# Patient Record
Sex: MALE | Race: BLACK OR AFRICAN AMERICAN | Employment: OTHER | RURAL
[De-identification: names, ages, dates, MRNs, and addresses within clinical notes are randomized per-mention and may not be internally consistent; named-entity substitution may affect disease eponyms.]

---

## 2017-01-03 DIAGNOSIS — I10 ESSENTIAL HYPERTENSION, BENIGN: ICD-10-CM

## 2017-01-03 RX ORDER — LISINOPRIL AND HYDROCHLOROTHIAZIDE 20; 25 MG/1; MG/1
TABLET ORAL
Qty: 90 TAB | Refills: 0 | Status: SHIPPED | OUTPATIENT
Start: 2017-01-03 | End: 2017-04-05 | Stop reason: SDUPTHER

## 2017-04-05 DIAGNOSIS — I10 ESSENTIAL HYPERTENSION, BENIGN: ICD-10-CM

## 2017-04-05 RX ORDER — LISINOPRIL AND HYDROCHLOROTHIAZIDE 20; 25 MG/1; MG/1
TABLET ORAL
Qty: 90 TAB | Refills: 0 | Status: SHIPPED | OUTPATIENT
Start: 2017-04-05 | End: 2017-07-07 | Stop reason: SDUPTHER

## 2017-04-05 NOTE — TELEPHONE ENCOUNTER
Patient would like a refill on lisinopril. He is scheduled to see Shilpi Nick on May 5, 2017. He has ONE tab left! [place].

## 2017-04-20 RX ORDER — VERAPAMIL HYDROCHLORIDE 240 MG/1
CAPSULE, EXTENDED RELEASE ORAL
Qty: 180 CAP | Refills: 3 | Status: SHIPPED | OUTPATIENT
Start: 2017-04-20 | End: 2018-04-17 | Stop reason: SDUPTHER

## 2017-04-28 RX ORDER — FENOFIBRATE 134 MG/1
CAPSULE ORAL
Qty: 90 CAP | Refills: 0 | Status: SHIPPED | OUTPATIENT
Start: 2017-04-28 | End: 2017-08-02 | Stop reason: SDUPTHER

## 2017-05-05 ENCOUNTER — OFFICE VISIT (OUTPATIENT)
Dept: FAMILY MEDICINE CLINIC | Age: 62
End: 2017-05-05

## 2017-05-05 VITALS
TEMPERATURE: 98 F | WEIGHT: 177.2 LBS | SYSTOLIC BLOOD PRESSURE: 160 MMHG | HEART RATE: 80 BPM | OXYGEN SATURATION: 99 % | HEIGHT: 64 IN | BODY MASS INDEX: 30.25 KG/M2 | DIASTOLIC BLOOD PRESSURE: 80 MMHG | RESPIRATION RATE: 20 BRPM

## 2017-05-05 DIAGNOSIS — N18.30 CKD (CHRONIC KIDNEY DISEASE) STAGE 3, GFR 30-59 ML/MIN (HCC): ICD-10-CM

## 2017-05-05 DIAGNOSIS — Z00.00 HEALTH CARE MAINTENANCE: ICD-10-CM

## 2017-05-05 DIAGNOSIS — E78.00 PURE HYPERCHOLESTEROLEMIA: ICD-10-CM

## 2017-05-05 DIAGNOSIS — I10 ESSENTIAL HYPERTENSION, BENIGN: Primary | ICD-10-CM

## 2017-05-05 DIAGNOSIS — Z23 ENCOUNTER FOR IMMUNIZATION: ICD-10-CM

## 2017-05-05 NOTE — PATIENT INSTRUCTIONS
Tetanus and Diphtheria Booster: Care Instructions  Your Care Instructions  A diphtheria and tetanus (Td) vaccine protects people against diphtheria and tetanus (lockjaw). You need a Td shot every 10 years to help prevent these diseases, which can be fatal.  You may also need a Td booster if you get a puncture wound or dirty cut. A booster is another dose of the vaccine. Young teens get a booster at 6to 15years of age. This booster is called Tdap and includes the vaccine against pertussis (whooping cough). All teens and adults who never had Tdap also need one dose of this vaccine. Common side effects of Td vaccination include soreness in the arm where you got the shot and a mild fever. These usually occur within 3 days of the shot and last a short time. Follow-up care is a key part of your treatment and safety. Be sure to make and go to all appointments, and call your doctor if you are having problems. It's also a good idea to know your test results and keep a list of the medicines you take. How can you care for yourself at home? · Take an over-the-counter pain medicine, such as acetaminophen (Tylenol), ibuprofen (Advil, Motrin), or naproxen (Aleve), if your arm is sore after the shot. Be safe with medicines. Read and follow all instructions on the label. Do not give aspirin to anyone younger than 20. It has been linked to Reye syndrome, a serious illness. · Put ice or a cold pack on the sore area for 10 to 20 minutes at a time. Put a thin cloth between the ice and your skin. When should you call for help? Call 911 anytime you think you may need emergency care. For example, call if:  · You have symptoms of a severe allergic reaction. These may include:  ¨ Sudden raised, red areas (hives) all over your body. ¨ Swelling of the throat, mouth, lips, or tongue. ¨ Trouble breathing. ¨ Passing out (losing consciousness). Or you may feel very lightheaded or suddenly feel weak, confused, or restless.   · You have a seizure. Call your doctor now or seek immediate medical care if:  · You have symptoms of an allergic reaction, such as:  ¨ A rash or hives (raised, red areas on the skin). ¨ Itching. ¨ Swelling. ¨ Belly pain, nausea, or vomiting. · You have a high fever. Watch closely for changes in your health, and be sure to contact your doctor if you have any problems. Where can you learn more? Go to http://jose-chester.info/. Enter V332 in the search box to learn more about \"Tetanus and Diphtheria Booster: Care Instructions. \"  Current as of: February 9, 2016  Content Version: 11.2  © 8605-3820 [employer]. Care instructions adapted under license by [employer] (which disclaims liability or warranty for this information). If you have questions about a medical condition or this instruction, always ask your healthcare professional. [place] [address] any warranty or liability for your use of this information. Shingles Vaccine: What You Need to Know  What is shingles? Shingles is a painful skin rash, often with blisters. It is also called herpes zoster, or just zoster. A shingles rash usually appears on one side of the face or body and lasts from 2 to 4 weeks. Its main symptom is pain, which can be quite severe. Other symptoms of shingles can include fever, headache, chills and upset stomach. Very rarely, a shingles infection can lead to pneumonia, hearing problems, blindness, brain inflammation (encephalitis) or death. For about 1 person in 5, severe pain can continue even long after the rash clears up. This is called post-herpetic neuralgia. Shingles is caused by the varicella zoster virus, the same virus that causes chickenpox. Only someone who has had chickenpox--or, rarely, has gotten chickenpox vaccine--can get shingles. The virus stays in your body, and can cause shingles many years later.   You can't catch shingles from another person with shingles. However, a person who has never had chickenpox (or chickenpox vaccine) could get chickenpox from someone with shingles. This is not very common. Shingles is far more common in people 48years of age and older than in younger people. It is also more common in people whose immune systems are weakened because of a disease such as cancer, or drugs such as steroids or chemotherapy. At least 1 million people a year in the [facility] get shingles. Shingles vaccine  A vaccine for shingles was licensed in 4238. In clinical trials, the vaccine reduced the risk of shingles by 50%. It can also reduce pain in people who still get shingles after being vaccinated. A single dose of shingles vaccine is recommended for adults 61years of age and older. Some people should not get shingles vaccine or should wait  A person should not get shingles vaccine who:  · Has ever had a life-threatening allergic reaction to gelatin, the antibiotic neomycin, or any other component of shingles vaccine. Tell your doctor if you have any severe allergies. · Has a weakened immune system because of current:  ¨ AIDS or another disease that affects the immune system. ¨ Treatment with drugs that affect the immune system, such as prolonged use of high-dose steroids. ¨ Cancer treatment such as radiation or chemotherapy. ¨ Cancer affecting the bone marrow or lymphatic system, such as leukemia or lymphoma. · Is pregnant, or might be pregnant. Women should not become pregnant until at least 4 weeks after getting shingles vaccine. Someone with a minor acute illness, such as a cold, may be vaccinated. But anyone with a moderate or severe acute illness should usually wait until they recover before getting the vaccine. This includes anyone with a temperature of 101.3° F or higher. What are the risks from shingles vaccine? A vaccine, like any medicine, could possibly cause serious problems, such as severe allergic reactions.  However, the risk of a vaccine causing serious harm, or death, is extremely small. No serious problems have been identified with shingles vaccine. Mild problems  · Redness, soreness, swelling, or itching at the site of the injection (about 1 person in 3)  · Headache (about 1 person in 70)  Like all vaccines, shingles vaccine is being closely monitored for unusual or severe problems. What if there is a serious reaction? What should I look for? · Look for anything that concerns you, such as signs of a severe allergic reaction, very high fever, or behavior changes. Signs of a severe allergic reaction can include hives, swelling of the face and throat, difficulty breathing, a fast heartbeat, dizziness, and weakness. These would start a few minutes to a few hours after the vaccination. What should I do? · If you think it is a severe allergic reaction or other emergency that can't wait, call 9-1-1 or get the person to the nearest hospital. Otherwise, call your doctor. · Afterward, the reaction should be reported to the Vaccine Adverse Event Reporting System (VAERS). Your doctor might file this report, or you can do it yourself through the VAERS web site at www.vaers. hhs.gov, or by calling 8-434.179.7028. VAERS is only for reporting reactions. They do not give medical advice. How can I learn more? · Ask your doctor. · Call your local or state health department. · Contact the Centers for Disease Control and Prevention (CDC):  ¨ Call 5-675.735.4786 (1-800-CDC-INFO) or  ¨ Visit CDC's website at www.cdc.gov/vaccines  Vaccine Information Statement  Shingles Vaccine  (10/6/2009)  Department of Health and Human Services  Centers for Disease Control and Prevention  Many Vaccine Information Statements are available in Malay and other languages. See www.immunize.org/vis. Muchas hojas de información sobre vacunas están disponibles en español y en otros idiomas. Visite www.immunize.org/vis.   Care instructions adapted under license by your healthcare professional. If you have questions about a medical condition or this instruction, always ask your healthcare professional. [place] [address] any warranty or liability for your use of this information.

## 2017-05-05 NOTE — PROGRESS NOTES
Subjective:     Shweta Guerra is a 62 y.o. male who presents today with the following:  Chief Complaint   Patient presents with    Cholesterol Problem     checkup    Hypertension   Shweta Guerra works as a  at [employer]. HTN: States he has white coat syndrome. Discussed checking BP at home and bringing in the readings. He is receptive. Doing well no interval problems. No chest pain, no angina no shortness of breath. No orthopnea or PND. No dependent edema. No abdominal pain, change in bowel habits, no blood in stool or black stools. CKD: No urinary frequency, urgency, dysuria. No change in voiding pattern or stream, no significant nocturia. Continues to follow-up with nephrology about every 6 months     No tobacco use, light social alcohol use   No problems with medications and is compliant. HM: Overdue on colonoscopy declines receptive to do a FIT test.  Fasting labs today. Sees an eye doctor and dentist on a regular basis.        ROS:  Gen: denies fever, chills, fatigue, weight loss, weight gain  HEENT:denies blurry vision, nasal congestion, sore throat  Resp: denies dypsnea, cough, wheezing  CV: denies chest pain radiating to the jaws or arms, palpitations, lower extremity edema  Abd: denies nausea, vomiting, diarrhea, constipation  Neuro: denies numbness/tingling  Endo: denies polyuria, polydipsia, heat/cold intolerance  Heme: no lymphadenopathy    No Known Allergies      Current Outpatient Prescriptions:     varicella zoster vacine live (VARICELLA-ZOSTER VACINE LIVE) 19,400 unit/0.65 mL susr injection, 1 Vial by SubCUTAneous route once for 1 dose., Disp: 0.65 mL, Rfl: 0    fenofibrate micronized (LOFIBRA) 134 mg capsule, TAKE ONE CAPSULE BY MOUTH EVERY DAY, Disp: 90 Cap, Rfl: 0    verapamil ER (VERELAN) 240 mg ER capsule, 2 Daily. (240 mg ER ), Disp: 180 Cap, Rfl: 3    lisinopril-hydroCHLOROthiazide (PRINZIDE, ZESTORETIC) 20-25 mg per tablet, Take 1 Tab by mouth daily., Disp: 90 Tab, Rfl: 0    KRILL OIL PO, Take 1 Tab by mouth daily. Alternates with fish oil., Disp: , Rfl:     omega-3 fatty acids-vitamin e (FISH OIL) 1,000 mg cap, Take 2 Caps by mouth daily. , Disp: , Rfl:     Past Medical History:   Diagnosis Date    CKD (chronic kidney disease) stage 3, GFR 30-59 ml/min     Hypertension     Hypopotassemia 2009    Other and unspecified hyperlipidemia 2008       No past surgical history on file. History   Smoking Status    Never Smoker   Smokeless Tobacco    Not on file       Social History     Social History    Marital status: UNKNOWN     Spouse name: N/A    Number of children: N/A    Years of education: N/A     Social History Main Topics    Smoking status: Never Smoker    Smokeless tobacco: None    Alcohol use No    Drug use: None    Sexual activity: Not Asked     Other Topics Concern    None     Social History Narrative       Family History   Problem Relation Age of Onset    Hypertension Mother          Objective:     Visit Vitals    /80 (BP 1 Location: Left arm, BP Patient Position: Sitting)    Pulse 80    Temp 98 °F (36.7 °C) (Temporal)    Resp 20    Ht 5' 4\" (1.626 m)    Wt 177 lb 3.2 oz (80.4 kg)    SpO2 99%    BMI 30.42 kg/m2     Body mass index is 30.42 kg/(m^2). General: Alert and oriented. No acute distress. Well nourished  HEENT :  Ears:TMs are normal. Canals are clear. Eyes: pupils equal, round, react to light and accommodation. Extra ocular movements intact. Nose: patent. Mouth and throat is clear. Neck:supple full range of motion no thyromegaly. Trachea midline, No carotid bruits. No significant lymphadenopathy  Lungs[de-identified] clear to auscultation without wheezes, rales, or rhonchi. Heart :RRR, S1 & S2 are normal intensity. No murmur; no gallop  Abdomen: bowel sounds active. No tenderness, guarding, rebound, masses, hepatic or spleen enlargement  Back: no CVA tenderness.   Extremities: without clubbing, cyanosis, or edema  Pulses: radial and femoral pulses are normal  Neuro: HMF intact. Cranial nerves II through XII grossly normal.  Motor: is 5 over 5 and symmetrical.   Deep tendon reflexes: +2 equal        No results found for this visit on 17. Assessment/ Plan:     Alane Opitz was seen today for cholesterol problem and hypertension. Diagnoses and all orders for this visit:    Essential hypertension, benign  -     LIPID PANEL  -     CBC WITH AUTOMATED DIFF  -     METABOLIC PANEL, COMPREHENSIVE    Pure hypercholesterolemia  -     LIPID PANEL  -     CBC WITH AUTOMATED DIFF  -     METABOLIC PANEL, COMPREHENSIVE    CKD (chronic kidney disease) stage 3, GFR 30-59 ml/min  -     CBC WITH AUTOMATED DIFF  -     METABOLIC PANEL, COMPREHENSIVE    Health care maintenance  -     HEPATITIS C AB  -     PSA - PROSTATE SPECIFIC AG    Encounter for immunization  -     TETANUS, DIPHTHERIA TOXOIDS AND ACELLULAR PERTUSSIS VACCINE (TDAP), IN INDIVIDS. >=7, IM    Other orders  -     varicella zoster vacine live (VARICELLA-ZOSTER VACINE LIVE) 19,400 unit/0.65 mL susr injection; 1 Vial by SubCUTAneous route once for 1 dose. 1. Essential hypertension, benign    2. Pure hypercholesterolemia    3. CKD (chronic kidney disease) stage 3, GFR 30-59 ml/min    4. Health care maintenance    5. Encounter for immunization        Orders Placed This Encounter    TETANUS, DIPHTHERIA TOXOIDS AND ACELLULAR PERTUSSIS VACCINE (TDAP), IN INDIVIDS. >=7, IM    LIPID PANEL    CBC WITH AUTOMATED DIFF    METABOLIC PANEL, COMPREHENSIVE    HEPATITIS C AB    PSA - PROSTATE SPECIFIC AG    varicella zoster vacine live (VARICELLA-ZOSTER VACINE LIVE) 19,400 unit/0.65 mL susr injection     Si Vial by SubCUTAneous route once for 1 dose. Dispense:  0.65 mL     Refill:  0     [facility]     RTO in 6 months or sooner as needed. Declined Tetanus vaccine at the end of the visit.     Verbal and written instructions (see AVS) provided.  Patient expresses understanding of diagnosis and treatment plan.     Tony Eden, NAM-C

## 2017-05-05 NOTE — MR AVS SNAPSHOT
Visit Information Date & Time Provider Department Dept. Phone Encounter #  
 5/5/2017  7:30 AM Bryant Murphy NP [facility] [address] [facility] [phone] [id number] Follow-up Instructions Return in about 6 months (around 11/5/2017). Upcoming Health Maintenance Date Due Hepatitis C Screening 1955 DTaP/Tdap/Td series (1 - Tdap) 6/25/1977 ZOSTER VACCINE AGE 60> 6/25/2016 FOBT Q 1 YEAR AGE 50-75 3/18/2017 INFLUENZA AGE 9 TO ADULT 8/1/2017 Allergies as of 5/5/2017  Review Complete On: 5/5/2017 By: Bryant Murphy NP No Known Allergies Current Immunizations  Never Reviewed Name Date Tdap  Incomplete Not reviewed this visit You Were Diagnosed With   
  
 Codes Comments Essential hypertension, benign    -  Primary ICD-10-CM: I10 
ICD-9-CM: 401.1 Pure hypercholesterolemia     ICD-10-CM: E78.00 ICD-9-CM: 272.0 CKD (chronic kidney disease) stage 3, GFR 30-59 ml/min     ICD-10-CM: N18.3 ICD-9-CM: 585.3 Health care maintenance     ICD-10-CM: Z00.00 ICD-9-CM: V70.0 Encounter for immunization     ICD-10-CM: Z37 ICD-9-CM: V03.89 Vitals BP Pulse Temp Resp Height(growth percentile) Weight(growth percentile) 160/80 (BP 1 Location: Left arm, BP Patient Position: Sitting) 80 98 °F (36.7 °C) (Temporal) 20 5' 4\" (1.626 m) 177 lb 3.2 oz (80.4 kg) SpO2 BMI Smoking Status 99% 30.42 kg/m2 Never Smoker BMI and BSA Data Body Mass Index Body Surface Area  
 30.42 kg/m 2 1.91 m 2 Preferred Pharmacy Pharmacy Name Phone [address], [address] [phone] Your Updated Medication List  
  
   
This list is accurate as of: 5/5/17  8:14 AM.  Always use your most recent med list.  
  
  
  
  
 fenofibrate micronized 134 mg capsule Commonly known as:  LOFIBRA TAKE ONE CAPSULE BY MOUTH EVERY DAY  
  
 FISH OIL 1,000 mg Cap Generic drug:  omega-3 fatty acids-vitamin e Take 2 Caps by mouth daily. KRILL OIL PO Take 1 Tab by mouth daily. Alternates with fish oil.  
  
 lisinopril-hydroCHLOROthiazide 20-25 mg per tablet Commonly known as:  Janora Rasp Take 1 Tab by mouth daily. varicella zoster vacine live 19,400 unit/0.65 mL Susr injection Commonly known as:  varicella-zoster vacine live 1 Vial by SubCUTAneous route once for 1 dose. verapamil  mg ER capsule Commonly known as:  VERELAN  
2 Daily. (240 mg ER ) Prescriptions Printed Refills  
 varicella zoster vacine live (VARICELLA-ZOSTER VACINE LIVE) 19,400 unit/0.65 mL susr injection 0 Si Vial by SubCUTAneous route once for 1 dose. Class: Print Route: SubCUTAneous We Performed the Following CBC WITH AUTOMATED DIFF [69600 CPT(R)] HEPATITIS C AB [64067 CPT(R)] LIPID PANEL [60228 CPT(R)] METABOLIC PANEL, COMPREHENSIVE [43122 CPT(R)] PROSTATE SPECIFIC AG (PSA) [id number] CPT(R)] TETANUS, DIPHTHERIA TOXOIDS AND ACELLULAR PERTUSSIS VACCINE (TDAP), IN INDIVIDS. >=7, IM [id number] CPT(R)] Follow-up Instructions Return in about 6 months (around 2017). Patient Instructions Tetanus and Diphtheria Booster: Care Instructions Your Care Instructions A diphtheria and tetanus (Td) vaccine protects people against diphtheria and tetanus (lockjaw). You need a Td shot every 10 years to help prevent these diseases, which can be fatal. 
You may also need a Td booster if you get a puncture wound or dirty cut. A booster is another dose of the vaccine. Young teens get a booster at 6to 15years of age. This booster is called Tdap and includes the vaccine against pertussis (whooping cough). All teens and adults who never had Tdap also need one dose of this vaccine.  
Common side effects of Td vaccination include soreness in the arm where you got the shot and a mild fever. These usually occur within 3 days of the shot and last a short time. Follow-up care is a key part of your treatment and safety. Be sure to make and go to all appointments, and call your doctor if you are having problems. It's also a good idea to know your test results and keep a list of the medicines you take. How can you care for yourself at home? · Take an over-the-counter pain medicine, such as acetaminophen (Tylenol), ibuprofen (Advil, Motrin), or naproxen (Aleve), if your arm is sore after the shot. Be safe with medicines. Read and follow all instructions on the label. Do not give aspirin to anyone younger than 20. It has been linked to Reye syndrome, a serious illness. · Put ice or a cold pack on the sore area for 10 to 20 minutes at a time. Put a thin cloth between the ice and your skin. When should you call for help? Call 911 anytime you think you may need emergency care. For example, call if: 
· You have symptoms of a severe allergic reaction. These may include: 
¨ Sudden raised, red areas (hives) all over your body. ¨ Swelling of the throat, mouth, lips, or tongue. ¨ Trouble breathing. ¨ Passing out (losing consciousness). Or you may feel very lightheaded or suddenly feel weak, confused, or restless. · You have a seizure. Call your doctor now or seek immediate medical care if: 
· You have symptoms of an allergic reaction, such as: ¨ A rash or hives (raised, red areas on the skin). ¨ Itching. ¨ Swelling. ¨ Belly pain, nausea, or vomiting. · You have a high fever. Watch closely for changes in your health, and be sure to contact your doctor if you have any problems. Where can you learn more? Go to http://jose-chester.info/. Enter V332 in the search box to learn more about \"Tetanus and Diphtheria Booster: Care Instructions. \" Current as of: February 9, 2016 Content Version: 11.2 © 7842-5279 Healthwise, [employer]. Care instructions adapted under license by [employer] (which disclaims liability or warranty for this information). If you have questions about a medical condition or this instruction, always ask your healthcare professional. [address] any warranty or liability for your use of this information. Shingles Vaccine: What You Need to Know What is shingles? Shingles is a painful skin rash, often with blisters. It is also called herpes zoster, or just zoster. A shingles rash usually appears on one side of the face or body and lasts from 2 to 4 weeks. Its main symptom is pain, which can be quite severe. Other symptoms of shingles can include fever, headache, chills and upset stomach. Very rarely, a shingles infection can lead to pneumonia, hearing problems, blindness, brain inflammation (encephalitis) or death. For about 1 person in 5, severe pain can continue even long after the rash clears up. This is called post-herpetic neuralgia. Shingles is caused by the varicella zoster virus, the same virus that causes chickenpox. Only someone who has had chickenpoxor, rarely, has gotten chickenpox vaccinecan get shingles. The virus stays in your body, and can cause shingles many years later. You can't catch shingles from another person with shingles. However, a person who has never had chickenpox (or chickenpox vaccine) could get chickenpox from someone with shingles. This is not very common. Shingles is far more common in people 48years of age and older than in younger people. It is also more common in people whose immune systems are weakened because of a disease such as cancer, or drugs such as steroids or chemotherapy. At least 1 million people a year in the [facility] get shingles. Shingles vaccine A vaccine for shingles was licensed in 3817.  In clinical trials, the vaccine reduced the risk of shingles by 50%. It can also reduce pain in people who still get shingles after being vaccinated. A single dose of shingles vaccine is recommended for adults 61years of age and older. Some people should not get shingles vaccine or should wait A person should not get shingles vaccine who: 
· Has ever had a life-threatening allergic reaction to gelatin, the antibiotic neomycin, or any other component of shingles vaccine. Tell your doctor if you have any severe allergies. · Has a weakened immune system because of current: 
¨ AIDS or another disease that affects the immune system. ¨ Treatment with drugs that affect the immune system, such as prolonged use of high-dose steroids. ¨ Cancer treatment such as radiation or chemotherapy. ¨ Cancer affecting the bone marrow or lymphatic system, such as leukemia or lymphoma. · Is pregnant, or might be pregnant. Women should not become pregnant until at least 4 weeks after getting shingles vaccine. Someone with a minor acute illness, such as a cold, may be vaccinated. But anyone with a moderate or severe acute illness should usually wait until they recover before getting the vaccine. This includes anyone with a temperature of 101.3° F or higher. What are the risks from shingles vaccine? A vaccine, like any medicine, could possibly cause serious problems, such as severe allergic reactions. However, the risk of a vaccine causing serious harm, or death, is extremely small. No serious problems have been identified with shingles vaccine. Mild problems · Redness, soreness, swelling, or itching at the site of the injection (about 1 person in 3) · Headache (about 1 person in 79) Like all vaccines, shingles vaccine is being closely monitored for unusual or severe problems. What if there is a serious reaction? What should I look for?  
· Look for anything that concerns you, such as signs of a severe allergic reaction, very high fever, or behavior changes. Signs of a severe allergic reaction can include hives, swelling of the face and throat, difficulty breathing, a fast heartbeat, dizziness, and weakness. These would start a few minutes to a few hours after the vaccination. What should I do? · If you think it is a severe allergic reaction or other emergency that can't wait, call 9-1-1 or get the person to the nearest hospital. Otherwise, call your doctor. · Afterward, the reaction should be reported to the Vaccine Adverse Event Reporting System (VAERS). Your doctor might file this report, or you can do it yourself through the VAERS web site at www.vaers. [facility].gov, or by calling 5-137.608.8065. VAERS is only for reporting reactions. They do not give medical advice. How can I learn more? · Ask your doctor. · Call your local or state health department. · Contact the Centers for Disease Control and Prevention (CDC): 
¨ Call 6-129.337.5596 (1-800-CDC-INFO) or ¨ Visit CDC's website at www.cdc.gov/vaccines Vaccine Information Statement Shingles Vaccine 
(10/6/2009) Department of Health and [employer] Centers for Disease Control and Prevention Many Vaccine Information Statements are available in Georgian and other languages. See www.immunize.org/vis. Muchas hojas de información sobre vacunas están disponibles en español y en otros idiomas. Visite www.immunize.org/vis. Care instructions adapted under license by your healthcare professional. If you have questions about a medical condition or this instruction, always ask your healthcare professional. [address] any warranty or liability for your use of this information. Introducing [facility]! [facility] introduces [employer] patient portal. Now you can access parts of your medical record, email your doctor's office, and request medication refills online.    
 
1. In your internet browser, go to [email or web address] 2. Click on the First Time User? Click Here link in the Sign In box. You will see the New Member Sign Up page. 3. Enter your LinkCycle Access Code exactly as it appears below. You will not need to use this code after youve completed the sign-up process. If you do not sign up before the expiration date, you must request a new code. · LinkCycle Access Code: [email or web address] Expires: 8/3/2017  8:14 AM 
 
4. Enter the last four digits of your Social Security Number (xxxx) and Date of Birth (mm/dd/yyyy) as indicated and click Submit. You will be taken to the next sign-up page. 5. Create a LinkCycle ID. This will be your LinkCycle login ID and cannot be changed, so think of one that is secure and easy to remember. 6. Create a LinkCycle password. You can change your password at any time. 7. Enter your Password Reset Question and Answer. This can be used at a later time if you forget your password. 8. Enter your e-mail address. You will receive e-mail notification when new information is available in [address] 9. Click Sign Up. You can now view and download portions of your medical record. 10. Click the Download Summary menu link to download a portable copy of your medical information. If you have questions, please visit the Frequently Asked Questions section of the LinkCycle website. Remember, LinkCycle is NOT to be used for urgent needs. For medical emergencies, dial 911. Now available from your iPhone and Android! Please provide this summary of care documentation to your next provider. Your primary care clinician is listed as Sofie Snellen. If you have any questions after today's visit, please call [phone].

## 2017-05-05 NOTE — ACP (ADVANCE CARE PLANNING)
Advance care planning discussed with patient given form and instructed to fill out and bring in to file in chart.

## 2017-05-06 LAB
ALBUMIN SERPL-MCNC: 4.9 G/DL (ref 3.6–4.8)
ALBUMIN/GLOB SERPL: 2.3 {RATIO} (ref 1.2–2.2)
ALP SERPL-CCNC: 29 IU/L (ref 39–117)
ALT SERPL-CCNC: 16 IU/L (ref 0–44)
AST SERPL-CCNC: 19 IU/L (ref 0–40)
BASOPHILS # BLD AUTO: 0 X10E3/UL (ref 0–0.2)
BASOPHILS NFR BLD AUTO: 1 %
BILIRUB SERPL-MCNC: 0.4 MG/DL (ref 0–1.2)
BUN SERPL-MCNC: 18 MG/DL (ref 8–27)
BUN/CREAT SERPL: 14 (ref 10–24)
CALCIUM SERPL-MCNC: 9.9 MG/DL (ref 8.6–10.2)
CHLORIDE SERPL-SCNC: 98 MMOL/L (ref 96–106)
CHOLEST SERPL-MCNC: 152 MG/DL (ref 100–199)
CO2 SERPL-SCNC: 24 MMOL/L (ref 18–29)
CREAT SERPL-MCNC: 1.26 MG/DL (ref 0.76–1.27)
EOSINOPHIL # BLD AUTO: 0.1 X10E3/UL (ref 0–0.4)
EOSINOPHIL NFR BLD AUTO: 3 %
ERYTHROCYTE [DISTWIDTH] IN BLOOD BY AUTOMATED COUNT: 14.2 % (ref 12.3–15.4)
GLOBULIN SER CALC-MCNC: 2.1 G/DL (ref 1.5–4.5)
GLUCOSE SERPL-MCNC: 108 MG/DL (ref 65–99)
HCT VFR BLD AUTO: 35.3 % (ref 37.5–51)
HCV AB S/CO SERPL IA: <0.1 S/CO RATIO (ref 0–0.9)
HDLC SERPL-MCNC: 55 MG/DL
HGB BLD-MCNC: 11.8 G/DL (ref 12.6–17.7)
IMM GRANULOCYTES # BLD: 0 X10E3/UL (ref 0–0.1)
IMM GRANULOCYTES NFR BLD: 0 %
INTERPRETATION: NORMAL
LDLC SERPL CALC-MCNC: 84 MG/DL (ref 0–99)
LYMPHOCYTES # BLD AUTO: 1.9 X10E3/UL (ref 0.7–3.1)
LYMPHOCYTES NFR BLD AUTO: 37 %
MCH RBC QN AUTO: 30.3 PG (ref 26.6–33)
MCHC RBC AUTO-ENTMCNC: 33.4 G/DL (ref 31.5–35.7)
MCV RBC AUTO: 91 FL (ref 79–97)
MONOCYTES # BLD AUTO: 0.4 X10E3/UL (ref 0.1–0.9)
MONOCYTES NFR BLD AUTO: 8 %
NEUTROPHILS # BLD AUTO: 2.6 X10E3/UL (ref 1.4–7)
NEUTROPHILS NFR BLD AUTO: 51 %
PLATELET # BLD AUTO: 331 X10E3/UL (ref 150–379)
POTASSIUM SERPL-SCNC: 3.8 MMOL/L (ref 3.5–5.2)
PROT SERPL-MCNC: 7 G/DL (ref 6–8.5)
PSA SERPL-MCNC: 0.8 NG/ML (ref 0–4)
RBC # BLD AUTO: 3.9 X10E6/UL (ref 4.14–5.8)
SODIUM SERPL-SCNC: 141 MMOL/L (ref 134–144)
TRIGL SERPL-MCNC: 64 MG/DL (ref 0–149)
VLDLC SERPL CALC-MCNC: 13 MG/DL (ref 5–40)
WBC # BLD AUTO: 5 X10E3/UL (ref 3.4–10.8)

## 2017-05-13 LAB — HEMOCCULT STL QL IA: NEGATIVE

## 2017-05-18 NOTE — PROGRESS NOTES
Negative occult blood. Hep C negative  Lipid panel within normal limits  Prostate normal limits  Metabolic panel glucose slightly elevated healthy diet recheck next visit  H&H slight decrease. Healthy diet and vitamins. Recheck level next visit.

## 2017-07-07 DIAGNOSIS — I10 ESSENTIAL HYPERTENSION, BENIGN: ICD-10-CM

## 2017-07-07 RX ORDER — LISINOPRIL AND HYDROCHLOROTHIAZIDE 20; 25 MG/1; MG/1
TABLET ORAL
Qty: 90 TAB | Refills: 3 | Status: SHIPPED | OUTPATIENT
Start: 2017-07-07 | End: 2018-07-16 | Stop reason: SDUPTHER

## 2017-08-03 RX ORDER — FENOFIBRATE 134 MG/1
CAPSULE ORAL
Qty: 90 CAP | Refills: 3 | Status: SHIPPED | OUTPATIENT
Start: 2017-08-03 | End: 2018-07-16 | Stop reason: SDUPTHER

## 2017-12-13 ENCOUNTER — OFFICE VISIT (OUTPATIENT)
Dept: FAMILY MEDICINE CLINIC | Age: 62
End: 2017-12-13

## 2017-12-13 VITALS
WEIGHT: 175 LBS | HEART RATE: 76 BPM | DIASTOLIC BLOOD PRESSURE: 62 MMHG | BODY MASS INDEX: 29.88 KG/M2 | HEIGHT: 64 IN | OXYGEN SATURATION: 99 % | TEMPERATURE: 97.5 F | RESPIRATION RATE: 20 BRPM | SYSTOLIC BLOOD PRESSURE: 122 MMHG

## 2017-12-13 DIAGNOSIS — N18.30 CKD (CHRONIC KIDNEY DISEASE) STAGE 3, GFR 30-59 ML/MIN (HCC): ICD-10-CM

## 2017-12-13 DIAGNOSIS — E78.00 PURE HYPERCHOLESTEROLEMIA: ICD-10-CM

## 2017-12-13 DIAGNOSIS — I10 ESSENTIAL HYPERTENSION, BENIGN: Primary | ICD-10-CM

## 2017-12-13 NOTE — PROGRESS NOTES
Subjective:     Laya Spears is a 65 y.o. male who presents today with the following:  Chief Complaint   Patient presents with    Hypertension     checkup    Cholesterol Problem   HPI:  Laya Spears works as a  for the local middle [employer]. Presents today for evaluation and management for chronic disease listed below:     COMPLIANT WITH MEDICATION:   HTN; Denies chest pain, dyspnea, palpitations, headache and blurred vision. Blood pressure normotensive. CKD: followed by Dr. Jose Claire will send labs      Hyperlipidemia: Check lipid panel today    BMI:Discussed the patient's BMI with him. The BMI follow up plan is as follows:     dietary management education, guidance, and counseling  encourage exercise  monitor weight  prescribed dietary intake    An After Visit Summary was printed and given to the patient. ROS:  Gen: denies fever, chills, fatigue, weight loss, weight gain  HEENT:denies blurry vision, nasal congestion, sore throat  Resp: denies dypsnea, cough, wheezing  CV: denies chest pain radiating to the jaws or arms, palpitations, lower extremity edema  Abd: denies nausea, vomiting, diarrhea, constipation  Neuro: denies numbness/tingling  Endo: denies polyuria, polydipsia, heat/cold intolerance  Heme: no lymphadenopathy    No Known Allergies      Current Outpatient Prescriptions:     fenofibrate micronized (LOFIBRA) 134 mg capsule, TAKE ONE CAPSULE BY MOUTH EVERY DAY, Disp: 90 Cap, Rfl: 3    lisinopril-hydroCHLOROthiazide (PRINZIDE, ZESTORETIC) 20-25 mg per tablet, Take 1 Tab by mouth daily. , Disp: 90 Tab, Rfl: 3    verapamil ER (VERELAN) 240 mg ER capsule, 2 Daily. (240 mg ER ), Disp: 180 Cap, Rfl: 3    omega-3 fatty acids-vitamin e (FISH OIL) 1,000 mg cap, Take 2 Caps by mouth daily. , Disp: , Rfl:     KRILL OIL PO, Take 1 Tab by mouth daily.  Alternates with fish oil., Disp: , Rfl:     Past Medical History:   Diagnosis Date    CKD (chronic kidney disease) stage 3, GFR 30-59 ml/min     Hypertension     Hypopotassemia 2009    Other and unspecified hyperlipidemia 2008       History reviewed. No pertinent surgical history. History   Smoking Status    Never Smoker   Smokeless Tobacco    Never Used       Social History     Social History    Marital status: UNKNOWN     Spouse name: N/A    Number of children: N/A    Years of education: N/A     Social History Main Topics    Smoking status: Never Smoker    Smokeless tobacco: Never Used    Alcohol use No    Drug use: None    Sexual activity: Not Asked     Other Topics Concern    None     Social History Narrative       Family History   Problem Relation Age of Onset    Hypertension Mother          Objective:     Visit Vitals    /62 (BP 1 Location: Left arm, BP Patient Position: Sitting)    Pulse 76    Temp 97.5 °F (36.4 °C) (Temporal)    Resp 20    Ht 5' 4\" (1.626 m)    Wt 175 lb (79.4 kg)    SpO2 99%    BMI 30.04 kg/m2     Body mass index is 30.04 kg/(m^2). General: Alert and oriented. No acute distress. Well nourished  HEENT :  Ears:TMs are normal. Canals are clear. Eyes: pupils equal, round, react to light and accommodation. Extra ocular movements intact. Nose: patent. Mouth and throat is clear. Neck:supple full range of motion no thyromegaly. Trachea midline, No carotid bruits. No significant lymphadenopathy  Lungs[de-identified] clear to auscultation without wheezes, rales, or rhonchi. Heart :RRR, S1 & S2 are normal intensity. No murmur; no gallop  Abdomen: bowel sounds active. No tenderness, guarding, rebound, masses, hepatic or spleen enlargement  Back: no CVA tenderness. Extremities: without clubbing, cyanosis, or edema  Pulses: radial and femoral pulses are normal  Neuro: HMF intact.  Cranial nerves II through XII grossly normal.  Motor: is 5 over 5 and symmetrical.   Deep tendon reflexes: +2 equal    Results for orders placed or performed in visit on 05/05/17   LIPID PANEL   Result Value Ref Range    Cholesterol, total 152 100 - 199 mg/dL    Triglyceride 64 0 - 149 mg/dL    HDL Cholesterol 55 >39 mg/dL    VLDL, calculated 13 5 - 40 mg/dL    LDL, calculated 84 0 - 99 mg/dL   CBC WITH AUTOMATED DIFF   Result Value Ref Range    WBC 5.0 3.4 - 10.8 x10E3/uL    RBC 3.90 (L) 4.14 - 5.80 x10E6/uL    HGB 11.8 (L) 12.6 - 17.7 g/dL    HCT 35.3 (L) 37.5 - 51.0 %    MCV 91 79 - 97 fL    MCH 30.3 26.6 - 33.0 pg    MCHC 33.4 31.5 - 35.7 g/dL    RDW 14.2 12.3 - 15.4 %    PLATELET 755 694 - 577 x10E3/uL    NEUTROPHILS 51 %    Lymphocytes 37 %    MONOCYTES 8 %    EOSINOPHILS 3 %    BASOPHILS 1 %    ABS. NEUTROPHILS 2.6 1.4 - 7.0 x10E3/uL    Abs Lymphocytes 1.9 0.7 - 3.1 x10E3/uL    ABS. MONOCYTES 0.4 0.1 - 0.9 x10E3/uL    ABS. EOSINOPHILS 0.1 0.0 - 0.4 x10E3/uL    ABS. BASOPHILS 0.0 0.0 - 0.2 x10E3/uL    IMMATURE GRANULOCYTES 0 %    ABS. IMM. GRANS. 0.0 0.0 - 0.1 [id number]/RL   METABOLIC PANEL, COMPREHENSIVE   Result Value Ref Range    Glucose 108 (H) 65 - 99 mg/dL    BUN 18 8 - 27 mg/dL    Creatinine 1.26 0.76 - 1.27 mg/dL    GFR est non-AA 62 >59 mL/min/1.73    GFR est AA 71 >59 mL/min/1.73    BUN/Creatinine ratio 14 10 - 24    Sodium 141 134 - 144 mmol/L    Potassium 3.8 3.5 - 5.2 mmol/L    Chloride 98 96 - 106 mmol/L    CO2 24 18 - 29 mmol/L    Calcium 9.9 8.6 - 10.2 mg/dL    Protein, total 7.0 6.0 - 8.5 g/dL    Albumin 4.9 (H) 3.6 - 4.8 g/dL    GLOBULIN, TOTAL 2.1 1.5 - 4.5 g/dL    A-G Ratio 2.3 (H) 1.2 - 2.2    Bilirubin, total 0.4 0.0 - 1.2 mg/dL    Alk.  phosphatase 29 (L) 39 - 117 IU/L    AST (SGOT) 19 0 - 40 IU/L    ALT (SGPT) 16 0 - 44 IU/L   HEPATITIS C AB   Result Value Ref Range    Hep C Virus Ab <0.1 0.0 - 0.9 s/co ratio   PROSTATE SPECIFIC AG (PSA)   Result Value Ref Range    Prostate Specific Ag 0.8 0.0 - 4.0 ng/mL   CKD REPORT   Result Value Ref Range    Interpretation Note    OCCULT BLOOD, IMMUNOASSAY (FIT)   Result Value Ref Range    Occult blood fecal, by IA Negative Negative       No results found for this visit on 12/13/17. Assessment/ Plan:     Diagnoses and all orders for this visit:    1. Essential hypertension, benign  -     CBC WITH AUTOMATED DIFF  -     LIPID PANEL  -     METABOLIC PANEL, COMPREHENSIVE  -     COLLECTION VENOUS BLOOD,VENIPUNCTURE  -     HEMOGLOBIN A1C WITH EAG    2. BMI 30.0-30.9,adult  -     CBC WITH AUTOMATED DIFF  -     LIPID PANEL  -     METABOLIC PANEL, COMPREHENSIVE  -     COLLECTION VENOUS BLOOD,VENIPUNCTURE  -     HEMOGLOBIN A1C WITH EAG    3. Pure hypercholesterolemia  -     CBC WITH AUTOMATED DIFF  -     LIPID PANEL  -     METABOLIC PANEL, COMPREHENSIVE  -     COLLECTION VENOUS BLOOD,VENIPUNCTURE  -     HEMOGLOBIN A1C WITH EAG    4. CKD (chronic kidney disease) stage 3, GFR 30-59 ml/min  -     CBC WITH AUTOMATED DIFF  -     LIPID PANEL  -     METABOLIC PANEL, COMPREHENSIVE  -     COLLECTION VENOUS BLOOD,VENIPUNCTURE  -     HEMOGLOBIN A1C WITH EAG         1. Essential hypertension, benign    2. BMI 30.0-30.9,adult    3. Pure hypercholesterolemia    4. CKD (chronic kidney disease) stage 3, GFR 30-59 ml/min        Orders Placed This Encounter    COLLECTION VENOUS BLOOD,VENIPUNCTURE    CBC WITH AUTOMATED DIFF    LIPID PANEL    METABOLIC PANEL, COMPREHENSIVE    HEMOGLOBIN A1C WITH EAG         Verbal and written instructions (see AVS) provided.  Patient expresses understanding of diagnosis and treatment plan. Follow-up Disposition:  Return in about 6 months (around 6/13/2018). or sooner as needed.       CATHERINE Montelongo

## 2017-12-13 NOTE — MR AVS SNAPSHOT
Visit Information Date & Time Provider Department Dept. Phone Encounter #  
 12/13/2017  7:30 AM Jair Kirk NP [facility] [address] [facility] [phone] [id number] Follow-up Instructions Return in about 6 months (around 6/13/2018). Upcoming Health Maintenance Date Due FOBT Q 1 YEAR AGE 50-75 5/9/2018 DTaP/Tdap/Td series (2 - Td) 12/13/2027 Allergies as of 12/13/2017  Review Complete On: 12/13/2017 By: Jair Kirk NP No Known Allergies Current Immunizations  Never Reviewed Name Date Zoster Vaccine, Live 5/17/2017 Not reviewed this visit You Were Diagnosed With   
  
 Codes Comments Essential hypertension, benign    -  Primary ICD-10-CM: I10 
ICD-9-CM: 401.1 BMI 30.0-30.9,adult     ICD-10-CM: Z68.30 ICD-9-CM: V85.30 Pure hypercholesterolemia     ICD-10-CM: E78.00 ICD-9-CM: 272.0 CKD (chronic kidney disease) stage 3, GFR 30-59 ml/min     ICD-10-CM: N18.3 ICD-9-CM: 747. 3 Vitals BP Pulse Temp Resp Height(growth percentile) 122/62 (BP 1 Location: Left arm, BP Patient Position: Sitting) 76 97.5 °F (36.4 °C) (Temporal) 20 5' 4\" (1.626 m) Weight(growth percentile) SpO2 BMI Smoking Status 175 lb (79.4 kg) 99% 30.04 kg/m2 Never Smoker BMI and BSA Data Body Mass Index Body Surface Area 30.04 kg/m 2 1.89 m 2 Preferred Pharmacy Pharmacy Name Phone [address], [address] [phone] Your Updated Medication List  
  
   
This list is accurate as of: 12/13/17  8:08 AM.  Always use your most recent med list.  
  
  
  
  
 fenofibrate micronized 134 mg capsule Commonly known as:  LOFIBRA TAKE ONE CAPSULE BY MOUTH EVERY DAY  
  
 FISH OIL 1,000 mg Cap Generic drug:  omega-3 fatty acids-vitamin e Take 2 Caps by mouth daily. KRILL OIL PO Take 1 Tab by mouth daily. Alternates with fish oil. lisinopril-hydroCHLOROthiazide 20-25 mg per tablet Commonly known as:  Socorro Ferro Take 1 Tab by mouth daily. verapamil  mg ER capsule Commonly known as:  VERELAN  
2 Daily. (240 mg ER ) We Performed the Following CBC WITH AUTOMATED DIFF [47269 CPT(R)] COLLECTION VENOUS BLOOD,VENIPUNCTURE S6198734 CPT(R)] HEMOGLOBIN A1C WITH EAG [44572 CPT(R)] LIPID PANEL [56323 CPT(R)] METABOLIC PANEL, COMPREHENSIVE [65576 CPT(R)] Follow-up Instructions Return in about 6 months (around 6/13/2018). Introducing [facility]! Valeria Vazquez introduces Brisk.io patient portal. Now you can access parts of your medical record, email your doctor's office, and request medication refills online. 1. In your internet browser, go to [email or web address] 2. Click on the First Time User? Click Here link in the Sign In box. You will see the New Member Sign Up page. 3. Enter your Brisk.io Access Code exactly as it appears below. You will not need to use this code after youve completed the sign-up process. If you do not sign up before the expiration date, you must request a new code. · Br[employer] Access Code: [email or web address] Expires: 3/13/2018  8:08 AM 
 
4. Enter the last four digits of your Social Security Number (xxxx) and Date of Birth (mm/dd/yyyy) as indicated and click Submit. You will be taken to the next sign-up page. 5. Create a Brisk[employer] ID. This will be your Br[employer].io login ID and cannot be changed, so think of one that is secure and easy to remember. 6. Create a Brisk[employer] password. You can change your password at any time. 7. Enter your Password Reset Question and Answer. This can be used at a later time if you forget your password. 8. Enter your e-mail address. You will receive e-mail notification when new information is available in [address] 9. Click Sign Up. You can now view and download portions of your medical record. 10. Click the Download Summary menu link to download a portable copy of your medical information. If you have questions, please visit the Frequently Asked Questions section of the [employer] website. Remember, [employer] is NOT to be used for urgent needs. For medical emergencies, dial 911. Now available from your iPhone and Android! Please provide this summary of care documentation to your next provider. Your primary care clinician is listed as Arvind Kay. If you have any questions after today's visit, please call [phone].

## 2017-12-14 LAB
ALBUMIN SERPL-MCNC: 4.6 G/DL (ref 3.6–4.8)
ALBUMIN/GLOB SERPL: 1.7 {RATIO} (ref 1.2–2.2)
ALP SERPL-CCNC: 33 IU/L (ref 39–117)
ALT SERPL-CCNC: 15 IU/L (ref 0–44)
AST SERPL-CCNC: 15 IU/L (ref 0–40)
BASOPHILS # BLD AUTO: 0.1 X10E3/UL (ref 0–0.2)
BASOPHILS NFR BLD AUTO: 1 %
BILIRUB SERPL-MCNC: 0.3 MG/DL (ref 0–1.2)
BUN SERPL-MCNC: 24 MG/DL (ref 8–27)
BUN/CREAT SERPL: 15 (ref 10–24)
CALCIUM SERPL-MCNC: 10.2 MG/DL (ref 8.6–10.2)
CHLORIDE SERPL-SCNC: 100 MMOL/L (ref 96–106)
CHOLEST SERPL-MCNC: 168 MG/DL (ref 100–199)
CO2 SERPL-SCNC: 23 MMOL/L (ref 18–29)
CREAT SERPL-MCNC: 1.55 MG/DL (ref 0.76–1.27)
EOSINOPHIL # BLD AUTO: 0.2 X10E3/UL (ref 0–0.4)
EOSINOPHIL NFR BLD AUTO: 5 %
ERYTHROCYTE [DISTWIDTH] IN BLOOD BY AUTOMATED COUNT: 13.5 % (ref 12.3–15.4)
EST. AVERAGE GLUCOSE BLD GHB EST-MCNC: 123 MG/DL
GFR SERPLBLD CREATININE-BSD FMLA CKD-EPI: 48 ML/MIN/1.73
GFR SERPLBLD CREATININE-BSD FMLA CKD-EPI: 55 ML/MIN/1.73
GLOBULIN SER CALC-MCNC: 2.7 G/DL (ref 1.5–4.5)
GLUCOSE SERPL-MCNC: 110 MG/DL (ref 65–99)
HBA1C MFR BLD: 5.9 % (ref 4.8–5.6)
HCT VFR BLD AUTO: 35.4 % (ref 37.5–51)
HDLC SERPL-MCNC: 46 MG/DL
HGB BLD-MCNC: 12 G/DL (ref 13–17.7)
IMM GRANULOCYTES # BLD: 0 X10E3/UL (ref 0–0.1)
IMM GRANULOCYTES NFR BLD: 0 %
INTERPRETATION: NORMAL
LDLC SERPL CALC-MCNC: 106 MG/DL (ref 0–99)
LYMPHOCYTES # BLD AUTO: 2.2 X10E3/UL (ref 0.7–3.1)
LYMPHOCYTES NFR BLD AUTO: 44 %
MCH RBC QN AUTO: 29.7 PG (ref 26.6–33)
MCHC RBC AUTO-ENTMCNC: 33.9 G/DL (ref 31.5–35.7)
MCV RBC AUTO: 88 FL (ref 79–97)
MONOCYTES # BLD AUTO: 0.5 X10E3/UL (ref 0.1–0.9)
MONOCYTES NFR BLD AUTO: 10 %
NEUTROPHILS # BLD AUTO: 2 X10E3/UL (ref 1.4–7)
NEUTROPHILS NFR BLD AUTO: 40 %
PLATELET # BLD AUTO: 373 X10E3/UL (ref 150–379)
POTASSIUM SERPL-SCNC: 4.1 MMOL/L (ref 3.5–5.2)
PROT SERPL-MCNC: 7.3 G/DL (ref 6–8.5)
RBC # BLD AUTO: 4.04 X10E6/UL (ref 4.14–5.8)
SODIUM SERPL-SCNC: 141 MMOL/L (ref 134–144)
TRIGL SERPL-MCNC: 78 MG/DL (ref 0–149)
VLDLC SERPL CALC-MCNC: 16 MG/DL (ref 5–40)
WBC # BLD AUTO: 5 X10E3/UL (ref 3.4–10.8)

## 2017-12-17 NOTE — PROGRESS NOTES
Anemia improving  Lipid panel cholesterol levels essentially normal  Metabolic panel  Good liver function. Kidney function chronic kidney disease. HGBA1C 5.9 great job! !!

## 2018-04-17 RX ORDER — VERAPAMIL HYDROCHLORIDE 240 MG/1
TABLET, FILM COATED, EXTENDED RELEASE ORAL
Qty: 180 TAB | Refills: 0 | Status: SHIPPED | OUTPATIENT
Start: 2018-04-17 | End: 2018-07-16 | Stop reason: SDUPTHER

## 2018-06-15 LAB — CREATININE, EXTERNAL: 1.47

## 2018-07-16 ENCOUNTER — OFFICE VISIT (OUTPATIENT)
Dept: FAMILY MEDICINE CLINIC | Age: 63
End: 2018-07-16

## 2018-07-16 VITALS
WEIGHT: 173.4 LBS | BODY MASS INDEX: 29.6 KG/M2 | OXYGEN SATURATION: 95 % | TEMPERATURE: 97.3 F | RESPIRATION RATE: 22 BRPM | HEART RATE: 71 BPM | HEIGHT: 64 IN | SYSTOLIC BLOOD PRESSURE: 112 MMHG | DIASTOLIC BLOOD PRESSURE: 60 MMHG

## 2018-07-16 DIAGNOSIS — D63.8 ANEMIA, CHRONIC DISEASE: ICD-10-CM

## 2018-07-16 DIAGNOSIS — N18.30 CKD (CHRONIC KIDNEY DISEASE) STAGE 3, GFR 30-59 ML/MIN (HCC): ICD-10-CM

## 2018-07-16 DIAGNOSIS — I10 ESSENTIAL HYPERTENSION, BENIGN: Primary | ICD-10-CM

## 2018-07-16 DIAGNOSIS — Z12.11 COLON CANCER SCREENING: ICD-10-CM

## 2018-07-16 RX ORDER — VERAPAMIL HYDROCHLORIDE 240 MG/1
TABLET, FILM COATED, EXTENDED RELEASE ORAL
Qty: 180 TAB | Refills: 3 | Status: SHIPPED | OUTPATIENT
Start: 2018-07-16 | End: 2019-08-02 | Stop reason: SDUPTHER

## 2018-07-16 RX ORDER — FENOFIBRATE 134 MG/1
CAPSULE ORAL
Qty: 90 CAP | Refills: 3 | Status: SHIPPED | OUTPATIENT
Start: 2018-07-16 | End: 2018-12-05 | Stop reason: SDUPTHER

## 2018-07-16 RX ORDER — LISINOPRIL AND HYDROCHLOROTHIAZIDE 20; 25 MG/1; MG/1
TABLET ORAL
Qty: 90 TAB | Refills: 3 | Status: SHIPPED | OUTPATIENT
Start: 2018-07-16 | End: 2019-07-25 | Stop reason: SDUPTHER

## 2018-07-16 NOTE — PROGRESS NOTES
1. Have you been to the ER, urgent care clinic since your last visit? Hospitalized since your last visit? No    2. Have you seen or consulted any other health care providers outside of the [address] [place] [address] since your last visit? Include any pap smears or colon screening.  No

## 2018-07-16 NOTE — MR AVS SNAPSHOT
[address] 
[phone] Patient: Bj Frias MRN: [id number] FCF:[id number] Visit Information Date & Time Provider Department Dept. Phone Encounter #  
 7/16/2018  9:00 AM Jair Kirk NP [facility] 13[address] [facility] [phone] [id number] Upcoming Health Maintenance Date Due FOBT Q 1 YEAR AGE 50-75 5/9/2018 Influenza Age 5 to Adult 8/1/2018 DTaP/Tdap/Td series (2 - Td) 12/13/2027 Allergies as of 7/16/2018  Review Complete On: 7/16/2018 By: Jair Kirk NP No Known Allergies Current Immunizations  Never Reviewed Name Date Zoster Vaccine, Live 5/17/2017 Not reviewed this visit You Were Diagnosed With   
  
 Codes Comments Essential hypertension, benign    -  Primary ICD-10-CM: I10 
ICD-9-CM: 401.1 Colon cancer screening     ICD-10-CM: Z12.11 ICD-9-CM: V76.51   
 BMI 29.0-29.9,adult     ICD-10-CM: L02.43 ICD-9-CM: V85.25 Anemia, chronic disease     ICD-10-CM: D63.8 ICD-9-CM: 285.29 CKD (chronic kidney disease) stage 3, GFR 30-59 ml/min     ICD-10-CM: N18.3 ICD-9-CM: 847. 3 Vitals BP Pulse Temp Resp Height(growth percentile) 112/60 (BP 1 Location: Left arm, BP Patient Position: Sitting) 71 97.3 °F (36.3 °C) (Temporal) 22 5' 4\" (1.626 m) Weight(growth percentile) SpO2 BMI Smoking Status 173 lb 6.4 oz (78.7 kg) 95% 29.76 kg/m2 Never Smoker Vitals History BMI and BSA Data Body Mass Index Body Surface Area  
 29.76 kg/m 2 1.89 m 2 Preferred Pharmacy Pharmacy Name Phone [phone], [address] [phone] Your Updated Medication List  
  
   
This list is accurate as of 7/16/18 10:10 AM.  Always use your most recent med list.  
  
  
  
  
 fenofibrate micronized 134 mg capsule Commonly known as:  LOFIBRA TAKE ONE CAPSULE BY MOUTH EVERY DAY  
  
 FISH OIL 1,000 mg Cap Generic drug:  omega-3 fatty acids-vitamin e Take 2 Caps by mouth daily. KRILL OIL PO Take 1 Tab by mouth daily. Alternates with fish oil.  
  
 lisinopril-hydroCHLOROthiazide 20-25 mg per tablet Commonly known as:  Christianne Audie Take 1 Tab by mouth daily. verapamil  mg CR tablet Commonly known as:  CALAN-SR  
TAKE 2 TABLETS BY MOUTH DAILY  Indications: hypertension Prescriptions Sent to Pharmacy Refills  
 verapamil ER (CALAN-SR) 240 mg CR tablet 3 Sig: TAKE 2 TABLETS BY MOUTH DAILY  Indications: hypertension Class: Normal  
 Pharmacy: [address] [place] [address] Ph #: [phone]  
 fenofibrate micronized (LOFIBRA) 134 mg capsule 3 Sig: TAKE ONE CAPSULE BY MOUTH EVERY DAY Class: Normal  
 Pharmacy: [address] [place] [address] Ph #: [phone]  
 lisinopril-hydroCHLOROthiazide (PRINZIDE, ZESTORETIC) 20-25 mg per tablet 3 Sig: Take 1 Tab by mouth daily. Class: Normal  
 Pharmacy: [address] [place] [address] Ph #: [phone] We Performed the Following CBC WITH AUTOMATED DIFF [06866 CPT(R)] COLLECTION VENOUS BLOOD,VENIPUNCTURE [id number] CPT(R)] LIPID PANEL [21695 CPT(R)] To-Do List   
 08/15/2018 Lab:  OCCULT BLOOD, IMMUNOASSAY (FIT) Introducing [facility]! Dalila Rai introduces [employer] patient portal. Now you can access parts of your medical record, email your doctor's office, and request medication refills online. 1. In your internet browser, go to [email or web address] 2. Click on the First Time User? Click Here link in the Sign In box. You will see the New Member Sign Up page. 3. Enter your [employer] Access Code exactly as it appears below.  You will not need to use this code after youve completed the sign-up process. If you do not sign up before the expiration date, you must request a new code. · [employer] Access Code: [email or web address] Expires: 10/14/2018 10:03 AM 
 
4. Enter the last four digits of your Social Security Number (xxxx) and Date of Birth (mm/dd/yyyy) as indicated and click Submit. You will be taken to the next sign-up page. 5. Create a [employer] ID. This will be your S[employer] login ID and cannot be changed, so think of one that is secure and easy to remember. 6. Create a [employer] password. You can change your password at any time. 7. Enter your Password Reset Question and Answer. This can be used at a later time if you forget your password. 8. Enter your e-mail address. You will receive e-mail notification when new information is available in [address] 9. Click Sign Up. You can now view and download portions of your medical record. 10. Click the Download Summary menu link to download a portable copy of your medical information. If you have questions, please visit the Frequently Asked Questions section of the [employer] website. Remember, [employer] is NOT to be used for urgent needs. For medical emergencies, dial 911. Now available from your iPhone and Android! Please provide this summary of care documentation to your next provider. Your primary care clinician is listed as Blas Wray. If you have any questions after today's visit, please call [phone].

## 2018-07-17 LAB
BASOPHILS # BLD AUTO: 0 X10E3/UL (ref 0–0.2)
BASOPHILS NFR BLD AUTO: 1 %
CHOLEST SERPL-MCNC: 171 MG/DL (ref 100–199)
EOSINOPHIL # BLD AUTO: 0.1 X10E3/UL (ref 0–0.4)
EOSINOPHIL NFR BLD AUTO: 2 %
ERYTHROCYTE [DISTWIDTH] IN BLOOD BY AUTOMATED COUNT: 13.8 % (ref 12.3–15.4)
HCT VFR BLD AUTO: 37.4 % (ref 37.5–51)
HDLC SERPL-MCNC: 48 MG/DL
HGB BLD-MCNC: 12.2 G/DL (ref 13–17.7)
IMM GRANULOCYTES # BLD: 0 X10E3/UL (ref 0–0.1)
IMM GRANULOCYTES NFR BLD: 0 %
INTERPRETATION: NORMAL
LDLC SERPL CALC-MCNC: 101 MG/DL (ref 0–99)
LYMPHOCYTES # BLD AUTO: 1.7 X10E3/UL (ref 0.7–3.1)
LYMPHOCYTES NFR BLD AUTO: 38 %
MCH RBC QN AUTO: 30.3 PG (ref 26.6–33)
MCHC RBC AUTO-ENTMCNC: 32.6 G/DL (ref 31.5–35.7)
MCV RBC AUTO: 93 FL (ref 79–97)
MONOCYTES # BLD AUTO: 0.3 X10E3/UL (ref 0.1–0.9)
MONOCYTES NFR BLD AUTO: 8 %
NEUTROPHILS # BLD AUTO: 2.3 X10E3/UL (ref 1.4–7)
NEUTROPHILS NFR BLD AUTO: 51 %
PLATELET # BLD AUTO: 368 X10E3/UL (ref 150–379)
RBC # BLD AUTO: 4.02 X10E6/UL (ref 4.14–5.8)
TRIGL SERPL-MCNC: 110 MG/DL (ref 0–149)
VLDLC SERPL CALC-MCNC: 22 MG/DL (ref 5–40)
WBC # BLD AUTO: 4.5 X10E3/UL (ref 3.4–10.8)

## 2018-07-18 NOTE — PROGRESS NOTES
Subjective:     Briseida Vaca is a 59 y.o. male who presents today with the following:  Chief Complaint   Patient presents with    Hypertension     checkup       Patient Active Problem List   Diagnosis Code    Hyperlipemia E78.5    Essential hypertension, benign I10    CKD (chronic kidney disease) stage 3, GFR 30-59 ml/min N18.3         COMPLIANT WITH MEDICATION:   HTN; Denies chest pain, dyspnea, palpitations, headache and blurred vision. Blood pressure normotensive. BMI:Discussed the patient's BMI with him. The BMI follow up plan is as follows:     dietary management education, guidance, and counseling  encourage exercise  monitor weight  prescribed dietary intake    An After Visit Summary was printed and given to the patient. HM: FIT Test given    Anemia chronic disease :  ealthy diet    CKD followed by Dr. Leila Soni  ROS:  Gen: denies fever, chills, fatigue, weight loss, weight gain  HEENT:denies blurry vision, nasal congestion, sore throat  Resp: denies dypsnea, cough, wheezing  CV: denies chest pain radiating to the jaws or arms, palpitations, lower extremity edema  Abd: denies nausea, vomiting, diarrhea, constipation  Neuro: denies numbness/tingling  Endo: denies polyuria, polydipsia, heat/cold intolerance  Heme: no lymphadenopathy    No Known Allergies      Current Outpatient Prescriptions:     verapamil ER (CALAN-SR) 240 mg CR tablet, TAKE 2 TABLETS BY MOUTH DAILY  Indications: hypertension, Disp: 180 Tab, Rfl: 3    fenofibrate micronized (LOFIBRA) 134 mg capsule, TAKE ONE CAPSULE BY MOUTH EVERY DAY, Disp: 90 Cap, Rfl: 3    lisinopril-hydroCHLOROthiazide (PRINZIDE, ZESTORETIC) 20-25 mg per tablet, Take 1 Tab by mouth daily. , Disp: 90 Tab, Rfl: 3    omega-3 fatty acids-vitamin e (FISH OIL) 1,000 mg cap, Take 2 Caps by mouth daily. , Disp: , Rfl:     KRILL OIL PO, Take 1 Tab by mouth daily.  Alternates with fish oil., Disp: , Rfl:     Past Medical History:   Diagnosis Date    CKD (chronic kidney disease) stage 3, GFR 30-59 ml/min     Hypertension     Hypopotassemia 2009    Other and unspecified hyperlipidemia 2008       History reviewed. No pertinent surgical history. History   Smoking Status    Never Smoker   Smokeless Tobacco    Never Used       Social History     Social History    Marital status: UNKNOWN     Spouse name: N/A    Number of children: N/A    Years of education: N/A     Social History Main Topics    Smoking status: Never Smoker    Smokeless tobacco: Never Used    Alcohol use No    Drug use: None    Sexual activity: Not Asked     Other Topics Concern    None     Social History Narrative       Family History   Problem Relation Age of Onset    Hypertension Mother          Objective:     Visit Vitals    /60 (BP 1 Location: Left arm, BP Patient Position: Sitting)    Pulse 71    Temp 97.3 °F (36.3 °C) (Temporal)    Resp 22    Ht 5' 4\" (1.626 m)    Wt 173 lb 6.4 oz (78.7 kg)    SpO2 95%    BMI 29.76 kg/m2     Body mass index is 29.76 kg/(m^2). General: Alert and oriented. No acute distress. Well nourished  HEENT :  Ears:TMs are normal. Canals are clear. Eyes: pupils equal, round, react to light and accommodation. Extra ocular movements intact. Nose: patent. Mouth and throat is clear. Neck:supple full range of motion no thyromegaly. Trachea midline, No carotid bruits. No significant lymphadenopathy  Lungs[de-identified] clear to auscultation without wheezes, rales, or rhonchi. Heart :RRR, S1 & S2 are normal intensity. No murmur; no gallop  Abdomen: bowel sounds active. No tenderness, guarding, rebound, masses, hepatic or spleen enlargement  Back: no CVA tenderness. Extremities: without clubbing, cyanosis, or edema  Pulses: radial and femoral pulses are normal  Neuro: HMF intact.  Cranial nerves II through XII grossly normal.  Motor: is 5 over 5 and symmetrical.   Deep tendon reflexes: +2 equal    Results for orders placed or performed in visit on 07/16/18   LIPID PANEL Result Value Ref Range    Cholesterol, total 171 100 - 199 mg/dL    Triglyceride 110 0 - 149 mg/dL    HDL Cholesterol 48 >39 mg/dL    VLDL, calculated 22 5 - 40 mg/dL    LDL, calculated 101 (H) 0 - 99 mg/dL   CBC WITH AUTOMATED DIFF   Result Value Ref Range    WBC 4.5 3.4 - 10.8 x10E3/uL    RBC 4.02 (L) 4.14 - 5.80 x10E6/uL    HGB 12.2 (L) 13.0 - 17.7 g/dL    HCT 37.4 (L) 37.5 - 51.0 %    MCV 93 79 - 97 fL    MCH 30.3 26.6 - 33.0 pg    MCHC 32.6 31.5 - 35.7 g/dL    RDW 13.8 12.3 - 15.4 %    PLATELET 922 964 - 271 x10E3/uL    NEUTROPHILS 51 Not Estab. %    Lymphocytes 38 Not Estab. %    MONOCYTES 8 Not Estab. %    EOSINOPHILS 2 Not Estab. %    BASOPHILS 1 Not Estab. %    ABS. NEUTROPHILS 2.3 1.4 - 7.0 x10E3/uL    Abs Lymphocytes 1.7 0.7 - 3.1 x10E3/uL    ABS. MONOCYTES 0.3 0.1 - 0.9 x10E3/uL    ABS. EOSINOPHILS 0.1 0.0 - 0.4 x10E3/uL    ABS. BASOPHILS 0.0 0.0 - 0.2 x10E3/uL    IMMATURE GRANULOCYTES 0 Not Estab. %    ABS. IMM.  GRANS. 0.0 0.0 - 0.1 x10E3/uL   CKD REPORT   Result Value Ref Range    Interpretation Note        Results for orders placed or performed in visit on 07/16/18   LIPID PANEL   Result Value Ref Range    Cholesterol, total 171 100 - 199 mg/dL    Triglyceride 110 0 - 149 mg/dL    HDL Cholesterol 48 >39 mg/dL    VLDL, calculated 22 5 - 40 mg/dL    LDL, calculated 101 (H) 0 - 99 mg/dL    Narrative    Performed at:  [address]  [id number]  : Ny Barth MD, Phone:  [phone]   CBC WITH AUTOMATED DIFF   Result Value Ref Range    WBC 4.5 3.4 - 10.8 x10E3/uL    RBC 4.02 (L) 4.14 - 5.80 x10E6/uL    HGB 12.2 (L) 13.0 - 17.7 g/dL    HCT 37.4 (L) 37.5 - 51.0 %    MCV 93 79 - 97 fL    MCH 30.3 26.6 - 33.0 pg    MCHC 32.6 31.5 - 35.7 g/dL    RDW 13.8 12.3 - 15.4 %    PLATELET 498 712 - 376 x10E3/uL    NEUTROPHILS 51 Not Estab. %    Lymphocytes 38 Not Estab. %    MONOCYTES 8 Not Estab. %    EOSINOPHILS 2 Not Estab. %    BASOPHILS 1 Not Estab. % ABS. NEUTROPHILS 2.3 1.4 - 7.0 x10E3/uL    Abs Lymphocytes 1.7 0.7 - 3.1 x10E3/uL    ABS. MONOCYTES 0.3 0.1 - 0.9 x10E3/uL    ABS. EOSINOPHILS 0.1 0.0 - 0.4 x10E3/uL    ABS. BASOPHILS 0.0 0.0 - 0.2 x10E3/uL    IMMATURE GRANULOCYTES 0 Not Estab. %    ABS. IMM. GRANS. 0.0 0.0 - 0.1 x10E3/uL    Narrative    Performed at:  [address]  [id number]  : Giovanni Sky MD, Phone:  [phone]   CKD REPORT   Result Value Ref Range    Interpretation Note     Narrative    Performed at:  [address] [place] [address] A  [address] [place] [address]  [id number]  : María Elena Uriarte MD, Phone:  [phone]       Assessment/ Plan:     1. Colon cancer screening    - OCCULT BLOOD, IMMUNOASSAY (FIT); Future    2. Essential hypertension, benign  BP meeting goal  - lisinopril-hydroCHLOROthiazide (PRINZIDE, ZESTORETIC) 20-25 mg per tablet; Take 1 Tab by mouth daily. Dispense: 90 Tab; Refill: 3  - LIPID PANEL  - COLLECTION VENOUS BLOOD,VENIPUNCTURE  - CBC WITH AUTOMATED DIFF    3. BMI 29.0-29.9,adult  Discussed the patient's BMI with him. The BMI follow up plan is as follows:     dietary management education, guidance, and counseling  encourage exercise  monitor weight  prescribed dietary intake    An After Visit Summary was printed and given to the patient.     4. Anemia, chronic disease    -- COLLECTION VENOUS BLOOD,VENIPUNCTURE  - CBC WITH AUTOMATED DIFF    5. CKD (chronic kidney disease) stage 3, GFR 30-59 ml/min  Followed by Dr. Jose Claire.   - LIPID PANEL  - [facility]  - [address] 256 DIFF      Orders Placed This Encounter    COLLECTION VENOUS BLOOD,VENIPUNCTURE    OCCULT BLOOD, IMMUNOASSAY (FIT)     Standing Status:   Future     Standing Expiration Date:   1/16/2019    LIPID PANEL    CBC WITH AUTOMATED DIFF    CKD REPORT    verapamil ER (CALAN-SR) 240 mg CR tablet     Sig: TAKE 2 TABLETS BY MOUTH DAILY  Indications: hypertension     Dispense:  180 Tab     Refill:  3    fenofibrate micronized (LOFIBRA) 134 mg capsule     Sig: TAKE ONE CAPSULE BY MOUTH EVERY DAY     Dispense:  90 Cap     Refill:  3    lisinopril-hydroCHLOROthiazide (PRINZIDE, ZESTORETIC) 20-25 mg per tablet     Sig: Take 1 Tab by mouth daily. Dispense:  90 Tab     Refill:  3         Verbal and written instructions (see AVS) provided.  Patient expresses understanding of diagnosis and treatment plan. Health Maintenance Due   Topic Date Due    FOBT Q 1 YEAR AGE 50-75  05/09/2018         Follow-up Disposition:  Return in about 6 months (around 1/16/2019).       CATHERINE Fernando

## 2018-07-20 ENCOUNTER — TELEPHONE (OUTPATIENT)
Dept: FAMILY MEDICINE CLINIC | Age: 63
End: 2018-07-20

## 2018-07-20 NOTE — TELEPHONE ENCOUNTER
----- Message from Roberta Landry sent at 7/20/2018  7:46 AM EDT -----  Regarding: Dr Constance Goode  Pt would like to know if he can take Advil or Aleve while he is on 2 Blood Pressures medicines, please call the pt at [phone].

## 2018-07-23 ENCOUNTER — OFFICE VISIT (OUTPATIENT)
Dept: FAMILY MEDICINE CLINIC | Age: 63
End: 2018-07-23

## 2018-07-23 VITALS
TEMPERATURE: 98.8 F | DIASTOLIC BLOOD PRESSURE: 57 MMHG | BODY MASS INDEX: 29.6 KG/M2 | OXYGEN SATURATION: 98 % | HEART RATE: 63 BPM | HEIGHT: 64 IN | SYSTOLIC BLOOD PRESSURE: 123 MMHG | WEIGHT: 173.4 LBS

## 2018-07-23 DIAGNOSIS — Z12.11 COLON CANCER SCREENING: ICD-10-CM

## 2018-07-23 DIAGNOSIS — M10.032 ACUTE IDIOPATHIC GOUT OF LEFT WRIST: Primary | ICD-10-CM

## 2018-07-23 DIAGNOSIS — N18.30 CKD (CHRONIC KIDNEY DISEASE) STAGE 3, GFR 30-59 ML/MIN (HCC): ICD-10-CM

## 2018-07-23 RX ORDER — INDOMETHACIN 50 MG/1
50 CAPSULE ORAL 3 TIMES DAILY
Qty: 90 CAP | Refills: 0 | Status: SHIPPED | OUTPATIENT
Start: 2018-07-23 | End: 2018-07-23

## 2018-07-23 RX ORDER — METHYLPREDNISOLONE ACETATE 80 MG/ML
80 INJECTION, SUSPENSION INTRA-ARTICULAR; INTRALESIONAL; INTRAMUSCULAR; SOFT TISSUE ONCE
Qty: 1 VIAL | Refills: 0
Start: 2018-07-23 | End: 2018-07-23

## 2018-07-23 RX ORDER — ALLOPURINOL 100 MG/1
100 TABLET ORAL DAILY
Qty: 30 TAB | Refills: 1 | Status: SHIPPED | OUTPATIENT
Start: 2018-07-23 | End: 2019-11-13 | Stop reason: SDUPTHER

## 2018-07-23 RX ORDER — METHYLPREDNISOLONE 4 MG/1
TABLET ORAL
Qty: 15 TAB | Refills: 0 | Status: SHIPPED | OUTPATIENT
Start: 2018-07-23 | End: 2019-01-07 | Stop reason: ALTCHOICE

## 2018-07-23 NOTE — PROGRESS NOTES
Subjective:      Cathy Amaro is an 59 y.o.  male who presents with an initial episode of an acute monoarticular arthritis in the  left wrist. The patient reports a few attacks involving the left wrist., no previous episodes of similar acute severe pain in any joint. Past history: no known history of gout. Cherylene Lout was eating a lot of seafood and oysters on Thursday and the pain began. In the past it usually resolves in a few hours except for this episode has been since Thursday. CKD followed by Dr. Aggarwal Query    Patient Active Problem List   Diagnosis Code    Hyperlipemia E78.5    Essential hypertension, benign I10    CKD (chronic kidney disease) stage 3, GFR 30-59 ml/min N18.3     Current Outpatient Prescriptions   Medication Sig Dispense Refill    allopurinol (ZYLOPRIM) 100 mg tablet Take 1 Tab by mouth daily. Indications: prevention of acute gout attack 30 Tab 1    methylPREDNISolone acetate (DEPO-MEDROL) 80 mg/mL injection 1 mL by IntraMUSCular route once for 1 dose. 1 Vial 0    methylPREDNISolone (MEDROL) 4 mg tab Take 5 tablets day one, take 4 tablets day two, take 3 tablets day three,take 2 tablets day four, take 1 tablet day five. 15 Tab 0    verapamil ER (CALAN-SR) 240 mg CR tablet TAKE 2 TABLETS BY MOUTH DAILY  Indications: hypertension 180 Tab 3    fenofibrate micronized (LOFIBRA) 134 mg capsule TAKE ONE CAPSULE BY MOUTH EVERY DAY 90 Cap 3    lisinopril-hydroCHLOROthiazide (PRINZIDE, ZESTORETIC) 20-25 mg per tablet Take 1 Tab by mouth daily. 90 Tab 3    KRILL OIL PO Take 1 Tab by mouth daily. Alternates with fish oil.  omega-3 fatty acids-vitamin e (FISH OIL) 1,000 mg cap Take 2 Caps by mouth daily. No Known Allergies  Past Medical History:   Diagnosis Date    CKD (chronic kidney disease) stage 3, GFR 30-59 ml/min     Hypertension     Hypopotassemia 2009    Other and unspecified hyperlipidemia 2008     No past surgical history on file.   Family History   Problem Relation Age of Onset    Hypertension Mother      Social History   Substance Use Topics    Smoking status: Never Smoker    Smokeless tobacco: Never Used    Alcohol use No        Review of Systems  Pertinent items are noted in HPI. Objective:     Visit Vitals    /57 (BP 1 Location: Left arm, BP Patient Position: Sitting)    Pulse 63    Temp 98.8 °F (37.1 °C) (Oral)    Ht 5' 4\" (1.626 m)    Wt 173 lb 6.4 oz (78.7 kg)    SpO2 98%    BMI 29.76 kg/m2     General: alert, cooperative, no distress, appears stated age   left wrist:       Erythema: moderate   Swelling: moderate   Tenderness: moderate   ROM: normal      Skin:  none noted     Lab Review  No results found for: URICA, UAU1       Assessment/Plan:     Clinically, this is consistent with acute gouty arthritis, initial episode  The etiology of gout, and intermittent nature of attacks, was discussed with the patient. Specific recommendations include avoiding rich/fatty foods, avoiding excessive alcohol/binge drinking, checking a serum uric acid level and other labs and consideration of arthrocentesis of the affected joint. Potential side effects of the treatment prescribed today, including the potential for developing gastrointestinal upset and diarrhea were discussed. Medications:Medrol 4 mg tab, Dept-Medrol 80mg/ml injection, allopurinol 100mg to start after attack has subsided. Labs:Uric Acid    ICD-10-CM ICD-9-CM    1. Acute idiopathic gout of left wrist M10.032 274.01 URIC ACID      COLLECTION VENOUS BLOOD,VENIPUNCTURE      METHYLPREDNISOLONE ACETATE INJECTION 80 MG      OK THER/PROPH/DIAG INJECTION, SUBCUT/IM      COLLECTION VENOUS BLOOD,VENIPUNCTURE   2. BMI 29.0-29.9,adult Z68.29 V85.25    3. CKD (chronic kidney disease) stage 3, GFR 30-59 ml/min N18.3 585. 3      Diagnoses and all orders for this visit:    1.  Acute idiopathic gout of left wrist  -     URIC ACID  -     COLLECTION VENOUS BLOOD,VENIPUNCTURE  -     METHYLPREDNISOLONE ACETATE INJECTION 80 MG  -     MS THER/PROPH/DIAG INJECTION, SUBCUT/IM  -     COLLECTION VENOUS BLOOD,VENIPUNCTURE    2. BMI 29.0-29.9,adult    3. CKD (chronic kidney disease) stage 3, GFR 30-59 ml/min    Other orders  -     allopurinol (ZYLOPRIM) 100 mg tablet; Take 1 Tab by mouth daily. Indications: prevention of acute gout attack  -     methylPREDNISolone acetate (DEPO-MEDROL) 80 mg/mL injection; 1 mL by IntraMUSCular route once for 1 dose. -     methylPREDNISolone (MEDROL) 4 mg tab; Take 5 tablets day one, take 4 tablets day two, take 3 tablets day three,take 2 tablets day four, take 1 tablet day five. Discussed the patient's BMI with him. The BMI follow up plan is as follows:     dietary management education, guidance, and counseling  encourage exercise  monitor weight  prescribed dietary intake    An After Visit Summary was printed and given to the patient.

## 2018-07-23 NOTE — MR AVS SNAPSHOT
[facility] 
 
 
 [address] 
[phone] Patient: Koby Tran MRN: [id number] QEI:[id number] Visit Information Date & Time Provider Department Dept. Phone Encounter #  
 7/23/2018  1:30 PM Kodi Blank NP [facility] [address] [facility] [phone] [id number] Your Appointments 1/7/2019  7:30 AM  
ESTABLISHED PATIENT with Kodi Blank NP  
[address] [place] ([address]) Appt Note: 6 mo F/U  
 [address] [address]  
[address] [facility] [address] [address] Upcoming Health Maintenance Date Due FOBT Q 1 YEAR AGE 50-75 5/9/2018 Influenza Age 5 to Adult 8/1/2018 DTaP/Tdap/Td series (2 - Td) 12/13/2027 Allergies as of 7/23/2018  Review Complete On: 7/23/2018 By: Kodi Blank NP No Known Allergies Current Immunizations  Never Reviewed Name Date Zoster Vaccine, Live 5/17/2017 Not reviewed this visit You Were Diagnosed With   
  
 Codes Comments Acute idiopathic gout of left wrist    -  Primary ICD-10-CM: J79.535 ICD-9-CM: 274.01   
 BMI 29.0-29.9,adult     ICD-10-CM: Y36.58 ICD-9-CM: V85.25 Vitals BP Pulse Temp Height(growth percentile) Weight(growth percentile) SpO2  
 123/57 (BP 1 Location: Left arm, BP Patient Position: Sitting) 63 98.8 °F (37.1 °C) (Oral) 5' 4\" (1.626 m) 173 lb 6.4 oz (78.7 kg) 98% BMI Smoking Status 29.76 kg/m2 Never Smoker BMI and BSA Data Body Mass Index Body Surface Area  
 29.76 kg/m 2 1.89 m 2 Preferred Pharmacy Pharmacy Name Phone [address] [phone] Your Updated Medication List  
  
   
This list is accurate as of 7/23/18  3:17 PM.  Always use your most recent med list.  
  
  
  
  
 allopurinol 100 mg tablet Commonly known as:  Vadim Garcia Take 1 Tab by mouth daily. Indications: prevention of acute gout attack  
  
 fenofibrate micronized 134 mg capsule Commonly known as:  LOFIBRA TAKE ONE CAPSULE BY MOUTH EVERY DAY  
  
 FISH OIL 1,000 mg Cap Generic drug:  omega-3 fatty acids-vitamin e Take 2 Caps by mouth daily. KRILL OIL PO Take 1 Tab by mouth daily. Alternates with fish oil.  
  
 lisinopril-hydroCHLOROthiazide 20-25 mg per tablet Commonly known as:  Conda Franc Take 1 Tab by mouth daily. methylPREDNISolone 4 mg Tab Commonly known as:  MEDROL Take 5 tablets day one, take 4 tablets day two, take 3 tablets day three,take 2 tablets day four, take 1 tablet day five. methylPREDNISolone acetate 80 mg/mL injection Commonly known as:  DEPO-MEDROL 1 mL by IntraMUSCular route once for 1 dose. verapamil  mg CR tablet Commonly known as:  CALAN-SR  
TAKE 2 TABLETS BY MOUTH DAILY  Indications: hypertension Prescriptions Sent to Pharmacy Refills  
 allopurinol (ZYLOPRIM) 100 mg tablet 1 Sig: Take 1 Tab by mouth daily. Indications: prevention of acute gout attack Class: Normal  
 Pharmacy: [address] [place] [address] Ph #: [phone] Route: Oral  
 methylPREDNISolone (MEDROL) 4 mg tab 0 Sig: Take 5 tablets day one, take 4 tablets day two, take 3 tablets day three,take 2 tablets day four, take 1 tablet day five. Class: Normal  
 Pharmacy: [address] [place] [address] Ph #: [phone] We Performed the Following COLLECTION VENOUS BLOOD,VENIPUNCTURE [id number] CPT(R)] COLLECTION VENOUS BLOOD,VENIPUNCTURE [id number] CPT(R)] METHYLPREDNISOLONE ACETATE INJECTION 80 MG [ [facility]] DC THER/PROPH/DIAG INJECTION, SUBCUT/IM [id number] CPT(R)] URIC ACID [id number] CPT(R)] Patient Instructions Gout: Care Instructions Your Care Instructions Gout is a form of arthritis caused by a buildup of uric acid crystals in a joint. It causes sudden attacks of pain, swelling, redness, and stiffness, usually in one joint, especially the big toe. Gout usually comes on without a cause. But it can be brought on by drinking alcohol (especially beer) or eating seafood and red meat. Taking certain medicines, such as diuretics or aspirin, also can bring on an attack of gout. Taking your medicines as prescribed and following up with your doctor regularly can help you avoid gout attacks in the future. Follow-up care is a key part of your treatment and safety. Be sure to make and go to all appointments, and call your doctor if you are having problems. It's also a good idea to know your test results and keep a list of the medicines you take. How can you care for yourself at home? · If the joint is swollen, put ice or a cold pack on the area for 10 to 20 minutes at a time. Put a thin cloth between the ice and your skin. · Prop up the sore limb on a pillow when you ice it or anytime you sit or lie down during the next 3 days. Try to keep it above the level of your heart. This will help reduce swelling. · Rest sore joints. Avoid activities that put weight or strain on the joints for a few days. Take short rest breaks from your regular activities during the day. · Take your medicines exactly as prescribed. Call your doctor if you think you are having a problem with your medicine. · Take pain medicines exactly as directed. ¨ If the doctor gave you a prescription medicine for pain, take it as prescribed. ¨ If you are not taking a prescription pain medicine, ask your doctor if you can take an over-the-counter medicine. · Eat less seafood and red meat. · Check with your doctor before drinking alcohol. · Losing weight, if you are overweight, may help reduce attacks of gout. But do not go on a [employer]. \" Losing a lot of weight in a short amount of time can cause a gout attack. When should you call for help? Call your doctor now or seek immediate medical care if: 
  · You have a fever.  
  · The joint is so painful you cannot use it.  
  · You have sudden, unexplained swelling, redness, warmth, or severe pain in one or more joints.  
 Watch closely for changes in your health, and be sure to contact your doctor if: 
  · You have joint pain.  
  · Your symptoms get worse or are not improving after 2 or 3 days. Where can you learn more? Go to http://jose-chester.info/. Enter G795 in the search box to learn more about \"Gout: Care Instructions. \" Current as of: October 10, 2017 Content Version: 11.7 © 6218-5410 [employer]. Care instructions adapted under license by [employer] (which disclaims liability or warranty for this information). If you have questions about a medical condition or this instruction, always ask your healthcare professional. [place] [address] any warranty or liability for your use of this information. Purine-Restricted Diet: Care Instructions Your Care Instructions Purines are substances that are found in some foods. Your body turns purines into uric acid. High levels of uric acid can cause gout, which is a form of arthritis that causes pain and inflammation in joints. You may be able to help control the amount of uric acid in your body by limiting high-purine foods in your diet. Follow-up care is a key part of your treatment and safety. Be sure to make and go to all appointments, and call your doctor if you are having problems. It's also a good idea to know your test results and keep a list of the medicines you take. How can you care for yourself at home? · Plan your meals and snacks around foods that are low in purines and are safe for you to eat. These foods include: ¨ Green vegetables and tomatoes. ¨ Fruits. ¨ Whole-grain breads, rice, and cereals. ¨ Eggs, peanut butter, and nuts. ¨ Low-fat milk, cheese, and other milk products. ¨ Popcorn. ¨ Gelatin desserts, chocolate, cocoa, and cakes and sweets, in small amounts. · You can eat certain foods that are medium-high in purines, but eat them only once in a while. These foods include: ¨ Legumes, such as dried beans and dried peas. You can have 1 cup cooked legumes each day. ¨ Asparagus, cauliflower, spinach, mushrooms, and green peas. ¨ Fish and seafood (other than very high-purine seafood). ¨ Oatmeal, wheat bran, and wheat germ. · Limit very high-purine foods, including: ¨ Organ meats, such as liver, kidneys, sweetbreads, and brains. ¨ Meats, including dudley, beef, pork, and lamb. ¨ Game meats and any other meats in large amounts. ¨ Anchovies, sardines, herring, mackerel, and scallops. ¨ Gravy. ¨ Beer. Where can you learn more? Go to http://joseLost Property Heavenchester.info/. Enter F448 in the search box to learn more about \"Purine-Restricted Diet: Care Instructions. \" Current as of: May 12, 2017 Content Version: 11.7 © 6119-0199 [employer]. Care instructions adapted under license by [employer] (which disclaims liability or warranty for this information). If you have questions about a medical condition or this instruction, always ask your healthcare professional. [place] [address] any warranty or liability for your use of this information. Introducing [facility]! Jaime Gudino introduces [employer] patient portal. Now you can access parts of your medical record, email your doctor's office, and request medication refills online. 1. In your internet browser, go to [email or web address] 2. Click on the First Time User? Click Here link in the Sign In box. You will see the New Member Sign Up page. 3. Enter your [employer] Access Code exactly as it appears below.  You will not need to use this code after youve completed the sign-up process. If you do not sign up before the expiration date, you must request a new code. · [employer] Access Code: [email or web address] Expires: 10/14/2018 10:03 AM 
 
4. Enter the last four digits of your Social Security Number (xxxx) and Date of Birth (mm/dd/yyyy) as indicated and click Submit. You will be taken to the next sign-up page. 5. Create a G[employer]o ID. This will be your G[employer] login ID and cannot be changed, so think of one that is secure and easy to remember. 6. Create a [employer] password. You can change your password at any time. 7. Enter your Password Reset Question and Answer. This can be used at a later time if you forget your password. 8. Enter your e-mail address. You will receive e-mail notification when new information is available in [address]. 9. Click Sign Up. You can now view and download portions of your medical record. 10. Click the Download Summary menu link to download a portable copy of your medical information. If you have questions, please visit the Frequently Asked Questions section of the [employer] website. Remember, [employer] is NOT to be used for urgent needs. For medical emergencies, dial 911. Now available from your iPhone and Android! Please provide this summary of care documentation to your next provider. Your primary care clinician is listed as Andry King. If you have any questions after today's visit, please call [phone].

## 2018-07-23 NOTE — ACP (ADVANCE CARE PLANNING)
Discussed importance of advanced medical directives with patient. Patient is capable of making decisions.   Beryle Dire NP-C

## 2018-07-23 NOTE — PROGRESS NOTES
1. Have you been to the ER, urgent care clinic since your last visit? Hospitalized since your last visit? No    2. Have you seen or consulted any other health care providers outside of the [address] [place] [address] since your last visit? Include any pap smears or colon screening.  No

## 2018-07-23 NOTE — PATIENT INSTRUCTIONS
Gout: Care Instructions  Your Care Instructions    Gout is a form of arthritis caused by a buildup of uric acid crystals in a joint. It causes sudden attacks of pain, swelling, redness, and stiffness, usually in one joint, especially the big toe. Gout usually comes on without a cause. But it can be brought on by drinking alcohol (especially beer) or eating seafood and red meat. Taking certain medicines, such as diuretics or aspirin, also can bring on an attack of gout. Taking your medicines as prescribed and following up with your doctor regularly can help you avoid gout attacks in the future. Follow-up care is a key part of your treatment and safety. Be sure to make and go to all appointments, and call your doctor if you are having problems. It's also a good idea to know your test results and keep a list of the medicines you take. How can you care for yourself at home? · If the joint is swollen, put ice or a cold pack on the area for 10 to 20 minutes at a time. Put a thin cloth between the ice and your skin. · Prop up the sore limb on a pillow when you ice it or anytime you sit or lie down during the next 3 days. Try to keep it above the level of your heart. This will help reduce swelling. · Rest sore joints. Avoid activities that put weight or strain on the joints for a few days. Take short rest breaks from your regular activities during the day. · Take your medicines exactly as prescribed. Call your doctor if you think you are having a problem with your medicine. · Take pain medicines exactly as directed. ¨ If the doctor gave you a prescription medicine for pain, take it as prescribed. ¨ If you are not taking a prescription pain medicine, ask your doctor if you can take an over-the-counter medicine. · Eat less seafood and red meat. · Check with your doctor before drinking alcohol. · Losing weight, if you are overweight, may help reduce attacks of gout. But do not go on a [employer]. \" Losing a lot of weight in a short amount of time can cause a gout attack. When should you call for help? Call your doctor now or seek immediate medical care if:    · You have a fever.     · The joint is so painful you cannot use it.     · You have sudden, unexplained swelling, redness, warmth, or severe pain in one or more joints.    Watch closely for changes in your health, and be sure to contact your doctor if:    · You have joint pain.     · Your symptoms get worse or are not improving after 2 or 3 days. Where can you learn more? Go to http://jose-chester.info/. Enter P996 in the search box to learn more about \"Gout: Care Instructions. \"  Current as of: October 10, 2017  Content Version: 11.7  © 0104-9997 [employer]. Care instructions adapted under license by [employer] (which disclaims liability or warranty for this information). If you have questions about a medical condition or this instruction, always ask your healthcare professional. [place] [address] any warranty or liability for your use of this information. Purine-Restricted Diet: Care Instructions  Your Care Instructions    Purines are substances that are found in some foods. Your body turns purines into uric acid. High levels of uric acid can cause gout, which is a form of arthritis that causes pain and inflammation in joints. You may be able to help control the amount of uric acid in your body by limiting high-purine foods in your diet. Follow-up care is a key part of your treatment and safety. Be sure to make and go to all appointments, and call your doctor if you are having problems. It's also a good idea to know your test results and keep a list of the medicines you take. How can you care for yourself at home? · Plan your meals and snacks around foods that are low in purines and are safe for you to eat. These foods include:  ¨ Green vegetables and tomatoes. ¨ Fruits.   ¨ Whole-grain breads, rice, and cereals. ¨ Eggs, peanut butter, and nuts. ¨ Low-fat milk, cheese, and other milk products. ¨ Popcorn. ¨ Gelatin desserts, chocolate, cocoa, and cakes and sweets, in small amounts. · You can eat certain foods that are medium-high in purines, but eat them only once in a while. These foods include:  ¨ Legumes, such as dried beans and dried peas. You can have 1 cup cooked legumes each day. ¨ Asparagus, cauliflower, spinach, mushrooms, and green peas. ¨ Fish and seafood (other than very high-purine seafood). ¨ Oatmeal, wheat bran, and wheat germ. · Limit very high-purine foods, including:  ¨ Organ meats, such as liver, kidneys, sweetbreads, and brains. ¨ Meats, including dudley, beef, pork, and lamb. ¨ Game meats and any other meats in large amounts. ¨ Anchovies, sardines, herring, mackerel, and scallops. ¨ Gravy. ¨ Beer. Where can you learn more? Go to http://jose-chester.info/. Enter F448 in the search box to learn more about \"Purine-Restricted Diet: Care Instructions. \"  Current as of: May 12, 2017  Content Version: 11.7  © 7204-1103 [employer]. Care instructions adapted under license by [employer] (which disclaims liability or warranty for this information). If you have questions about a medical condition or this instruction, always ask your healthcare professional. [place] [address] any warranty or liability for your use of this information.

## 2018-07-24 ENCOUNTER — TELEPHONE (OUTPATIENT)
Dept: FAMILY MEDICINE CLINIC | Age: 63
End: 2018-07-24

## 2018-07-24 LAB — URATE SERPL-MCNC: 7.3 MG/DL (ref 3.7–8.6)

## 2018-07-24 NOTE — TELEPHONE ENCOUNTER
Kiya Marks, she stated that she meant to had cancelled the Indomethacin, so it can be disregarded. I called to [address] to inform her to cancel the Indomethacin. She stated OK.

## 2018-07-24 NOTE — TELEPHONE ENCOUNTER
Mr. Mason Duenas called stating that he is at the pharmacy to  his medication, but it is 3 there instead of 2. He was not sure what the 3 rd one is for. I SW the pharmacist, she stated it is Indomethacin 15 mg which is used for gout flare ups. I stated to Mr. Mason Duenas that I did not see it as a Rx that went out today or on his medication list, but will check with Vinay Pierce to confirm.

## 2018-07-25 ENCOUNTER — TELEPHONE (OUTPATIENT)
Dept: FAMILY MEDICINE CLINIC | Age: 63
End: 2018-07-25

## 2018-07-28 LAB — HEMOCCULT STL QL IA: NEGATIVE

## 2018-08-06 ENCOUNTER — TELEPHONE (OUTPATIENT)
Dept: FAMILY MEDICINE CLINIC | Age: 63
End: 2018-08-06

## 2018-08-06 NOTE — TELEPHONE ENCOUNTER
Pt had a couple of questions about his lab work that he received in the mail, he wanted to know if he was reading it right. Can a nurse call him back at [phone].

## 2018-08-06 NOTE — TELEPHONE ENCOUNTER
SW  Rustytammy Whitney, his FIT test results that he was confirming was confirmed to him as negative. He stated OK.

## 2018-12-05 RX ORDER — FENOFIBRATE 134 MG/1
CAPSULE ORAL
Qty: 90 CAP | Refills: 0 | Status: SHIPPED | OUTPATIENT
Start: 2018-12-05 | End: 2019-08-02 | Stop reason: SDUPTHER

## 2019-07-25 DIAGNOSIS — I10 ESSENTIAL HYPERTENSION, BENIGN: ICD-10-CM

## 2019-07-26 RX ORDER — LISINOPRIL AND HYDROCHLOROTHIAZIDE 20; 25 MG/1; MG/1
TABLET ORAL
Qty: 90 TAB | Refills: 0 | Status: SHIPPED | OUTPATIENT
Start: 2019-07-26 | End: 2019-08-02 | Stop reason: SDUPTHER

## 2021-06-23 ENCOUNTER — OFFICE VISIT (OUTPATIENT)
Dept: FAMILY MEDICINE CLINIC | Age: 66
End: 2021-06-23
Payer: MEDICARE

## 2021-06-23 VITALS
HEIGHT: 64 IN | SYSTOLIC BLOOD PRESSURE: 158 MMHG | TEMPERATURE: 97.8 F | RESPIRATION RATE: 18 BRPM | HEART RATE: 78 BPM | DIASTOLIC BLOOD PRESSURE: 84 MMHG | WEIGHT: 179.6 LBS | OXYGEN SATURATION: 98 % | BODY MASS INDEX: 30.66 KG/M2

## 2021-06-23 DIAGNOSIS — E78.00 HYPERCHOLESTEROLEMIA: ICD-10-CM

## 2021-06-23 DIAGNOSIS — I10 ESSENTIAL HYPERTENSION, BENIGN: Primary | ICD-10-CM

## 2021-06-23 PROCEDURE — G8754 DIAS BP LESS 90: HCPCS | Performed by: NURSE PRACTITIONER

## 2021-06-23 PROCEDURE — G8417 CALC BMI ABV UP PARAM F/U: HCPCS | Performed by: NURSE PRACTITIONER

## 2021-06-23 PROCEDURE — G8753 SYS BP > OR = 140: HCPCS | Performed by: NURSE PRACTITIONER

## 2021-06-23 PROCEDURE — G8427 DOCREV CUR MEDS BY ELIG CLIN: HCPCS | Performed by: NURSE PRACTITIONER

## 2021-06-23 PROCEDURE — G8432 DEP SCR NOT DOC, RNG: HCPCS | Performed by: NURSE PRACTITIONER

## 2021-06-23 PROCEDURE — 99213 OFFICE O/P EST LOW 20 MIN: CPT | Performed by: NURSE PRACTITIONER

## 2021-06-23 PROCEDURE — 3017F COLORECTAL CA SCREEN DOC REV: CPT | Performed by: NURSE PRACTITIONER

## 2021-06-23 PROCEDURE — 36415 COLL VENOUS BLD VENIPUNCTURE: CPT | Performed by: NURSE PRACTITIONER

## 2021-06-23 NOTE — Clinical Note
Please contact patient to schedule a follow up in 1 to 2 months for BP htn follow up.    Thanks Rhonda CARMEN

## 2021-06-23 NOTE — PROGRESS NOTES
Chief Complaint   Patient presents with    Hypertension     6 month check up    Ear Fullness     ears ringing a lot per pt     Visit Vitals  BP (!) 158/84 (BP 1 Location: Left arm, BP Patient Position: Sitting, BP Cuff Size: Adult)   Pulse 78   Temp 97.8 °F (36.6 °C) (Temporal)   Resp 18   Ht 5' 4\" (1.626 m)   Wt 179 lb 9.6 oz (81.5 kg)   SpO2 98%   BMI 30.83 kg/m²     3 most recent PHQ Screens 6/23/2021   Little interest or pleasure in doing things Not at all   Feeling down, depressed, irritable, or hopeless Not at all   Total Score PHQ 2 0     Abuse Screening Questionnaire 6/23/2021   Do you ever feel afraid of your partner? N   Are you in a relationship with someone who physically or mentally threatens you? N   Is it safe for you to go home? Y       1. Have you been to the ER, urgent care clinic since your last visit? Hospitalized since your last visit? Yes  [address]    2. Have you seen or consulted any other health care providers outside of the [address] [place] [address] since your last visit? Include any pap smears or colon screening.  No

## 2021-06-24 LAB
ALBUMIN SERPL-MCNC: 4.6 G/DL (ref 3.5–5)
ALBUMIN/GLOB SERPL: 1.4 {RATIO} (ref 1.1–2.2)
ALP SERPL-CCNC: 40 U/L (ref 45–117)
ALT SERPL-CCNC: 38 U/L (ref 12–78)
ANION GAP SERPL CALC-SCNC: 8 MMOL/L (ref 5–15)
AST SERPL-CCNC: 24 U/L (ref 15–37)
BASOPHILS # BLD: 0.1 K/UL (ref 0–0.1)
BASOPHILS NFR BLD: 1 % (ref 0–1)
BILIRUB SERPL-MCNC: 0.5 MG/DL (ref 0.2–1)
BUN SERPL-MCNC: 22 MG/DL (ref 6–20)
BUN/CREAT SERPL: 18 (ref 12–20)
CALCIUM SERPL-MCNC: 10.4 MG/DL (ref 8.5–10.1)
CHLORIDE SERPL-SCNC: 107 MMOL/L (ref 97–108)
CHOLEST SERPL-MCNC: 216 MG/DL
CO2 SERPL-SCNC: 26 MMOL/L (ref 21–32)
CREAT SERPL-MCNC: 1.23 MG/DL (ref 0.7–1.3)
DIFFERENTIAL METHOD BLD: NORMAL
EOSINOPHIL # BLD: 0.1 K/UL (ref 0–0.4)
EOSINOPHIL NFR BLD: 3 % (ref 0–7)
ERYTHROCYTE [DISTWIDTH] IN BLOOD BY AUTOMATED COUNT: 14.2 % (ref 11.5–14.5)
GLOBULIN SER CALC-MCNC: 3.3 G/DL (ref 2–4)
GLUCOSE SERPL-MCNC: 126 MG/DL (ref 65–100)
HCT VFR BLD AUTO: 38.6 % (ref 36.6–50.3)
HDLC SERPL-MCNC: 55 MG/DL
HDLC SERPL: 3.9 {RATIO} (ref 0–5)
HGB BLD-MCNC: 13 G/DL (ref 12.1–17)
IMM GRANULOCYTES # BLD AUTO: 0 K/UL (ref 0–0.04)
IMM GRANULOCYTES NFR BLD AUTO: 0 % (ref 0–0.5)
LDLC SERPL CALC-MCNC: 125.6 MG/DL (ref 0–100)
LYMPHOCYTES # BLD: 1.7 K/UL (ref 0.8–3.5)
LYMPHOCYTES NFR BLD: 36 % (ref 12–49)
MCH RBC QN AUTO: 31 PG (ref 26–34)
MCHC RBC AUTO-ENTMCNC: 33.7 G/DL (ref 30–36.5)
MCV RBC AUTO: 92.1 FL (ref 80–99)
MONOCYTES # BLD: 0.5 K/UL (ref 0–1)
MONOCYTES NFR BLD: 11 % (ref 5–13)
NEUTS SEG # BLD: 2.3 K/UL (ref 1.8–8)
NEUTS SEG NFR BLD: 49 % (ref 32–75)
NRBC # BLD: 0 K/UL (ref 0–0.01)
NRBC BLD-RTO: 0 PER 100 WBC
PLATELET # BLD AUTO: 350 K/UL (ref 150–400)
PMV BLD AUTO: 10.3 FL (ref 8.9–12.9)
POTASSIUM SERPL-SCNC: 3.5 MMOL/L (ref 3.5–5.1)
PROT SERPL-MCNC: 7.9 G/DL (ref 6.4–8.2)
RBC # BLD AUTO: 4.19 M/UL (ref 4.1–5.7)
SODIUM SERPL-SCNC: 141 MMOL/L (ref 136–145)
TRIGL SERPL-MCNC: 177 MG/DL (ref ?–150)
VLDLC SERPL CALC-MCNC: 35.4 MG/DL
WBC # BLD AUTO: 4.7 K/UL (ref 4.1–11.1)

## 2021-06-24 NOTE — PROGRESS NOTES
Lipid panel improving LDL level but seeing an increase in triglycerides. Per our discussion  ,at time of the visit-cut down on saturated fats, salty foods and processed food.  A healthy eating plan:  \" Emphasizes vegetables, fruits, whole grains, and fat-free or low-fat dairy products  \" Includes lean meats, poultry, fish, beans, eggs, and nuts  \" Limits saturated and trans fats, sodium, and added sugars  \" Controls portion sizes

## 2021-06-28 NOTE — PROGRESS NOTES
Subjective:     Dewayne Carreon is a 73 y.o. male who presents today with the following:  Chief Complaint   Patient presents with    Hypertension     6 month check up    Ear Fullness     ears ringing a lot per pt       Patient Active Problem List   Diagnosis Code    Hyperlipemia E78.5    Essential hypertension, benign I10    CKD (chronic kidney disease) stage 3, GFR 30-59 ml/min ([facility]) N18.30         COMPLIANT WITH MEDICATION:   HTN; Denies chest pain, dyspnea, palpitations, headache and blurred vision. Blood pressure elevated today. Eating salty foods and fried foods. Plans to stop immediately. Ear fullness. Ringing sensation x 3 weeks . He denies drainage, exudate or drainage from the ears. We discussed referral to ENT Dr. Ben Maldonado. depression screening addressed not at risk    abuse screening addressed denies    learning assessment addressed reviewed nurses notes    fall risk addressed not at risk    HM: addressed Medicare yearly exam due. ROS:  Gen: denies fever, chills, fatigue, weight loss, weight gain  HEENT:denies blurry vision, nasal congestion, sore throat  Resp: denies dypsnea, cough, wheezing  CV: denies chest pain radiating to the jaws or arms, palpitations, lower extremity edema  Abd: denies nausea, vomiting, diarrhea, constipation  Neuro: denies numbness/tingling  Endo: denies polyuria, polydipsia, heat/cold intolerance  Heme: no lymphadenopathy    No Known Allergies      Current Outpatient Medications:     ferrous sulfate (Slow Fe) 142 mg (45 mg iron) ER tablet, Take  by mouth Daily (before breakfast). , Disp: , Rfl:     lisinopril-hydroCHLOROthiazide (PRINZIDE, ZESTORETIC) 20-25 mg per tablet, TAKE ONE TABLET BY MOUTH EVERY DAY, Disp: 90 Tab, Rfl: 3    aluminum-magnesium hydroxide (MAALOX) 200-200 mg/5 mL suspension, Take 15 mL by mouth every six (6) hours as needed for Indigestion.  (Patient not taking: Reported on 6/23/2021), Disp: 30 mL, Rfl: 3    Past Medical History: Diagnosis Date    CKD (chronic kidney disease) stage 3, GFR 30-59 ml/min ([facility])     Hypertension     Hypopotassemia 2009    Other and unspecified hyperlipidemia 2008       History reviewed. No pertinent surgical history. Social History     Tobacco Use   Smoking Status Never Smoker   Smokeless Tobacco Never Used       Social History     Socioeconomic History    Marital status: UNKNOWN     Spouse name: Not on file    Number of children: Not on file    Years of education: Not on file    Highest education level: Not on file   Tobacco Use    Smoking status: Never Smoker    Smokeless tobacco: Never Used   Vaping Use    Vaping Use: Never used   Substance and Sexual Activity    Alcohol use: No     Social Determinants of Health     Financial Resource Strain:     Difficulty of Paying Living Expenses:    Food Insecurity:     Worried About Running Out of Food in the Last Year:     [address] in the Last Year:    Transportation Needs:     Lack of Transportation (Medical):  Lack of Transportation (Non-Medical):    Physical Activity:     Days of Exercise per Week:     Minutes of Exercise per Session:    Stress:     Feeling of Stress :    Social Connections:     Frequency of Communication with Friends and Family:     Frequency of Social Gatherings with Friends and Family:     Attends Muslim Services:     Active Member of Clubs or Organizations:     Attends Club or Organization Meetings:     Marital Status:        Family History   Problem Relation Age of Onset    Hypertension Mother          Objective:     Visit Vitals  BP (!) 158/84 (BP 1 Location: Left arm, BP Patient Position: Sitting, BP Cuff Size: Adult)   Pulse 78   Temp 97.8 °F (36.6 °C) (Temporal)   Resp 18   Ht 5' 4\" (1.626 m)   Wt 179 lb 9.6 oz (81.5 kg)   SpO2 98%   BMI 30.83 kg/m²     Body mass index is 30.83 kg/m². General: Alert and oriented. No acute distress. Well nourished  HEENT :  Ears:TMs are normal. Canals are clear. Eyes: pupils equal, round, react to light and accommodation. Extra ocular movements intact. Nose: patent. Mouth and throat is clear. Neck:supple full range of motion no thyromegaly. Trachea midline, No carotid bruits. No significant lymphadenopathy  Lungs[de-identified] clear to auscultation without wheezes, rales, or rhonchi. Heart :RRR, S1 & S2 are normal intensity. No murmur; no gallop  Abdomen: bowel sounds active. No tenderness, guarding, rebound, masses, hepatic or spleen enlargement  Back: no CVA tenderness. Extremities: without clubbing, cyanosis, or edema  Pulses: radial and femoral pulses are normal  Neuro: HMF intact. Cranial nerves II through XII grossly normal.  Motor: is 5 over 5 and symmetrical.   Deep tendon reflexes: +2 equal  Psych:appropriate behavior, mood, affect and judgement. Results for orders placed or performed in visit on 76/51/69   METABOLIC PANEL, COMPREHENSIVE   Result Value Ref Range    Sodium 141 136 - 145 mmol/L    Potassium 3.5 3.5 - 5.1 mmol/L    Chloride 107 97 - 108 mmol/L    CO2 26 21 - 32 mmol/L    Anion gap 8 5 - 15 mmol/L    Glucose 126 (H) 65 - 100 mg/dL    BUN 22 (H) 6 - 20 MG/DL    Creatinine 1.23 0.70 - 1.30 MG/DL    BUN/Creatinine ratio 18 12 - 20      GFR est AA >60 >60 ml/min/1.73m2    GFR est non-AA 59 (L) >60 ml/min/1.73m2    Calcium 10.4 (H) 8.5 - 10.1 MG/DL    Bilirubin, total 0.5 0.2 - 1.0 MG/DL    ALT (SGPT) 38 12 - 78 U/L    AST (SGOT) 24 15 - 37 U/L    Alk. phosphatase 40 (L) 45 - 117 U/L    Protein, total 7.9 6.4 - 8.2 g/dL    Albumin 4.6 3.5 - 5.0 g/dL    Globulin 3.3 2.0 - 4.0 g/dL    A-G Ratio 1.4 1.1 - 2.2     LIPID PANEL   Result Value Ref Range    Cholesterol, total 216 (H) <200 MG/DL    Triglyceride 177 (H) <150 MG/DL    HDL Cholesterol 55 MG/DL    LDL, calculated 125.6 (H) 0 - 100 MG/DL    VLDL, calculated 35.4 MG/DL    CHOL/HDL Ratio 3.9 0.0 - 5.0     CBC WITH AUTOMATED DIFF   Result Value Ref Range    WBC 4.7 4.1 - 11.1 K/uL    RBC 4.19 4. 10 - 5.70 M/uL HGB 13.0 12.1 - 17.0 g/dL    HCT 38.6 36.6 - 50.3 %    MCV 92.1 80.0 - 99.0 FL    MCH 31.0 26.0 - 34.0 PG    MCHC 33.7 30.0 - 36.5 g/dL    RDW 14.2 11.5 - 14.5 %    PLATELET 727 199 - 000 K/uL    MPV 10.3 8.9 - 12.9 FL    NRBC 0.0 0  WBC    ABSOLUTE NRBC 0.00 0.00 - 0.01 K/uL    NEUTROPHILS 49 32 - 75 %    LYMPHOCYTES 36 12 - 49 %    MONOCYTES 11 5 - 13 %    EOSINOPHILS 3 0 - 7 %    BASOPHILS 1 0 - 1 %    IMMATURE GRANULOCYTES 0 0.0 - 0.5 %    ABS. NEUTROPHILS 2.3 1.8 - 8.0 K/UL    ABS. LYMPHOCYTES 1.7 0.8 - 3.5 K/UL    ABS. MONOCYTES 0.5 0.0 - 1.0 K/UL    ABS. EOSINOPHILS 0.1 0.0 - 0.4 K/UL    ABS. BASOPHILS 0.1 0.0 - 0.1 K/UL    ABS. IMM. GRANS. 0.0 0.00 - 0.04 K/UL    DF AUTOMATED         Results for orders placed or performed in visit on 18/16/11   METABOLIC PANEL, COMPREHENSIVE   Result Value Ref Range    Sodium 141 136 - 145 mmol/L    Potassium 3.5 3.5 - 5.1 mmol/L    Chloride 107 97 - 108 mmol/L    CO2 26 21 - 32 mmol/L    Anion gap 8 5 - 15 mmol/L    Glucose 126 (H) 65 - 100 mg/dL    BUN 22 (H) 6 - 20 MG/DL    Creatinine 1.23 0.70 - 1.30 MG/DL    BUN/Creatinine ratio 18 12 - 20      GFR est AA >60 >60 ml/min/1.73m2    GFR est non-AA 59 (L) >60 ml/min/1.73m2    Calcium 10.4 (H) 8.5 - 10.1 MG/DL    Bilirubin, total 0.5 0.2 - 1.0 MG/DL    ALT (SGPT) 38 12 - 78 U/L    AST (SGOT) 24 15 - 37 U/L    Alk. phosphatase 40 (L) 45 - 117 U/L    Protein, total 7.9 6.4 - 8.2 g/dL    Albumin 4.6 3.5 - 5.0 g/dL    Globulin 3.3 2.0 - 4.0 g/dL    A-G Ratio 1.4 1.1 - 2.2     LIPID PANEL   Result Value Ref Range    Cholesterol, total 216 (H) <200 MG/DL    Triglyceride 177 (H) <150 MG/DL    HDL Cholesterol 55 MG/DL    LDL, calculated 125.6 (H) 0 - 100 MG/DL    VLDL, calculated 35.4 MG/DL    CHOL/HDL Ratio 3.9 0.0 - 5.0     CBC WITH AUTOMATED DIFF   Result Value Ref Range    WBC 4.7 4.1 - 11.1 K/uL    RBC 4.19 4. 10 - 5.70 M/uL    HGB 13.0 12.1 - 17.0 g/dL    HCT 38.6 36.6 - 50.3 %    MCV 92.1 80.0 - 99.0 FL    MCH 31.0 26.0 - 34.0 PG    MCHC 33.7 30.0 - 36.5 g/dL    RDW 14.2 11.5 - 14.5 %    PLATELET 102 771 - 360 K/uL    MPV 10.3 8.9 - 12.9 FL    NRBC 0.0 0  WBC    ABSOLUTE NRBC 0.00 0.00 - 0.01 K/uL    NEUTROPHILS 49 32 - 75 %    LYMPHOCYTES 36 12 - 49 %    MONOCYTES 11 5 - 13 %    EOSINOPHILS 3 0 - 7 %    BASOPHILS 1 0 - 1 %    IMMATURE GRANULOCYTES 0 0.0 - 0.5 %    ABS. NEUTROPHILS 2.3 1.8 - 8.0 K/UL    ABS. LYMPHOCYTES 1.7 0.8 - 3.5 K/UL    ABS. MONOCYTES 0.5 0.0 - 1.0 K/UL    ABS. EOSINOPHILS 0.1 0.0 - 0.4 K/UL    ABS. BASOPHILS 0.1 0.0 - 0.1 K/UL    ABS. IMM. GRANS. 0.0 0.00 - 0.04 K/UL    DF AUTOMATED         Assessment/ Plan:     1. Essential hypertension, benign  Continue zestoric and verapamil as prescribed  - CBC WITH AUTOMATED DIFF; Future  - LIPID PANEL; Future  - METABOLIC PANEL, COMPREHENSIVE; Future  - COLLECTION VENOUS BLOOD,VENIPUNCTURE; Future  - COLLECTION VENOUS BLOOD,VENIPUNCTURE  - METABOLIC PANEL, COMPREHENSIVE  - LIPID PANEL  - CBC WITH AUTOMATED DIFF    2. Hypercholesterolemia  Continue fenofibrate  - CBC WITH AUTOMATED DIFF; Future  - LIPID PANEL; Future  - METABOLIC PANEL, COMPREHENSIVE; Future  - COLLECTION VENOUS BLOOD,VENIPUNCTURE; Future  - COLLECTION VENOUS BLOOD,VENIPUNCTURE  - METABOLIC PANEL, COMPREHENSIVE  - LIPID PANEL  - CBC WITH AUTOMATED DIFF      Orders Placed This Encounter    COLLECTION VENOUS BLOOD,VENIPUNCTURE     Standing Status:   Future     Number of Occurrences:   1     Standing Expiration Date:   7/23/2021    CBC WITH AUTOMATED DIFF     Standing Status:   Future     Number of Occurrences:   1     Standing Expiration Date:   7/23/2021    LIPID PANEL     Standing Status:   Future     Number of Occurrences:   1     Standing Expiration Date:   9/50/5942    METABOLIC PANEL, COMPREHENSIVE     Standing Status:   Future     Number of Occurrences:   1     Standing Expiration Date:   7/23/2021         Verbal and written instructions (see AVS) provided.   Patient expresses understanding of diagnosis and treatment plan.     Health Maintenance Due   Topic Date Due    Pneumococcal 65+ years (1 of 1 - PPSV23) Never done    Medicare Yearly Exam  06/23/2021               CATHERINE Gonzalez

## 2021-07-26 ENCOUNTER — TELEPHONE (OUTPATIENT)
Dept: FAMILY MEDICINE CLINIC | Age: 66
End: 2021-07-26

## 2021-07-26 RX ORDER — FENOFIBRATE 134 MG/1
134 CAPSULE ORAL
Qty: 30 CAPSULE | Refills: 2 | Status: SHIPPED | OUTPATIENT
Start: 2021-07-26 | End: 2021-07-27

## 2021-07-26 NOTE — TELEPHONE ENCOUNTER
----- Message from Lily Griffiths sent at 7/26/2021  9:25 AM EDT -----  Regarding: NP Cobos/Refill  Medication Refill    Caller (if not patient): pt      Relationship of caller (if not patient): self      Best contact number(s): [phone]      Name of medication and dosage if known:  \"Fenofiderate  1.34 gram 1 day\"      Is patient out of this medication (yes/no):  no      Pharmacy name: [facility] in [address] listed in chart? (yes/no): NO  Pharmacy phone number: [phone]      Details to clarify the request:  Pt wants all his scripts to go to the [facility] in [place] now.     Thank You      Lily Griffiths

## 2021-07-26 NOTE — TELEPHONE ENCOUNTER
Requested Prescriptions     Pending Prescriptions Disp Refills    fenofibrate micronized (LOFIBRA) 134 mg capsule 30 Capsule      Sig: Take 1 Capsule by mouth every morning.

## 2021-08-03 ENCOUNTER — TELEPHONE (OUTPATIENT)
Dept: FAMILY MEDICINE CLINIC | Age: 66
End: 2021-08-03

## 2021-08-03 DIAGNOSIS — I10 ESSENTIAL HYPERTENSION, BENIGN: ICD-10-CM

## 2021-08-03 RX ORDER — FENOFIBRATE 134 MG/1
CAPSULE ORAL
Qty: 90 CAPSULE | Refills: 3 | Status: SHIPPED | OUTPATIENT
Start: 2021-08-03 | End: 2021-08-09 | Stop reason: CLARIF

## 2021-08-03 RX ORDER — FERROUS SULFATE 137(45) MG
142 TABLET, EXTENDED RELEASE ORAL
Qty: 90 TABLET | Refills: 1 | Status: SHIPPED | OUTPATIENT
Start: 2021-08-03

## 2021-08-03 RX ORDER — LISINOPRIL AND HYDROCHLOROTHIAZIDE 20; 25 MG/1; MG/1
TABLET ORAL
Qty: 90 TABLET | Refills: 3 | Status: SHIPPED | OUTPATIENT
Start: 2021-08-03 | End: 2021-08-04

## 2021-08-03 NOTE — TELEPHONE ENCOUNTER
----- Message from Lavonkeiko Shafer sent at 8/3/2021 10:15 AM EDT -----  Regarding: JENNY Cobos/ telephone  General Message/Vendor Calls    Caller's first and last name: Pt      Reason for call: Pt is calling about his meds that is suppose to be going new pharmacy [facility], pt states that he is running low on his meds. Pt first called about this on 7/26/21 and never heard back. Callback required yes/no and why: yes      Best contact number(s):[phone]      Details to clarify the request: n/a      Sarah Lucas went to wrong pharmacy. It should have gone to [facility] in [facility]. Fenofibrate.

## 2021-08-13 ENCOUNTER — TELEPHONE (OUTPATIENT)
Dept: FAMILY MEDICINE CLINIC | Age: 66
End: 2021-08-13

## 2021-08-13 RX ORDER — VERAPAMIL HYDROCHLORIDE 240 MG/1
TABLET, FILM COATED, EXTENDED RELEASE ORAL
Qty: 180 TABLET | Refills: 3 | Status: SHIPPED | OUTPATIENT
Start: 2021-08-13 | End: 2021-11-04 | Stop reason: SDUPTHER

## 2021-08-13 NOTE — TELEPHONE ENCOUNTER
Pt calling saying he only has 2 pills of verapamil left says pharmacy sent us a message but im not seeing it .  Pt uses [facility]

## 2021-08-18 DIAGNOSIS — E78.00 HYPERCHOLESTEROLEMIA: Primary | ICD-10-CM

## 2021-08-18 RX ORDER — FENOFIBRATE 160 MG/1
160 TABLET ORAL DAILY
Qty: 90 TABLET | Refills: 3 | Status: SHIPPED | OUTPATIENT
Start: 2021-08-18 | End: 2021-08-24 | Stop reason: SDUPTHER

## 2021-08-24 ENCOUNTER — CLINICAL SUPPORT (OUTPATIENT)
Dept: FAMILY MEDICINE CLINIC | Age: 66
End: 2021-08-24

## 2021-08-24 VITALS
OXYGEN SATURATION: 98 % | SYSTOLIC BLOOD PRESSURE: 144 MMHG | HEART RATE: 78 BPM | TEMPERATURE: 97.5 F | DIASTOLIC BLOOD PRESSURE: 70 MMHG | RESPIRATION RATE: 20 BRPM

## 2021-08-24 DIAGNOSIS — E78.00 HYPERCHOLESTEROLEMIA: ICD-10-CM

## 2021-08-24 DIAGNOSIS — I10 ESSENTIAL HYPERTENSION, BENIGN: Primary | ICD-10-CM

## 2021-08-24 DIAGNOSIS — I10 ESSENTIAL HYPERTENSION, BENIGN: ICD-10-CM

## 2021-08-24 RX ORDER — FENOFIBRATE 160 MG/1
160 TABLET ORAL DAILY
Qty: 90 TABLET | Refills: 3 | Status: SHIPPED | OUTPATIENT
Start: 2021-08-24 | End: 2021-11-23 | Stop reason: SDUPTHER

## 2021-08-24 RX ORDER — LISINOPRIL AND HYDROCHLOROTHIAZIDE 20; 25 MG/1; MG/1
TABLET ORAL
Qty: 90 TABLET | Refills: 3 | Status: SHIPPED | OUTPATIENT
Start: 2021-08-24 | End: 2021-11-23 | Stop reason: SDUPTHER

## 2021-08-24 NOTE — PROGRESS NOTES
Patient presented to the office for a BP check, see vitals, also stated BP at 7 at home this morning read 165 / 85, followed by taking his medications. The reading was reported verbally to [facility]. She stated, that is good, he can do a 3 months follow up or sometimes before the year is out if it continues to improve. Per  [place], he already has an appointment scheduled for January 2022. [facility] spoke briefly with patient, stated that's OK, but if it elevates back up or any onset of swelling in hands, legs or feet, he would need to call the office back. Patient stated OK he understands, but he needs two refills, Lisinopril and fenofibrate sent to [facility].

## 2021-08-26 ENCOUNTER — TELEPHONE (OUTPATIENT)
Dept: FAMILY MEDICINE CLINIC | Age: 66
End: 2021-08-26

## 2021-08-26 NOTE — TELEPHONE ENCOUNTER
PC left VM stating there was a change on 08/03/20121 regarding the question to his medication. It might was due to his situation at the time, but cannot discuss over the phone, but he can give the office a call back on tomorrow if he has any more questions he needs to clarify.

## 2021-08-26 NOTE — TELEPHONE ENCOUNTER
----- Message from Ruddy Christian sent at 8/26/2021  4:29 PM EDT -----  Regarding: KVNG Cobos/Telephone  General Message/Vendor Calls    Caller's first and last name:  N/A      Reason for call: Medication      Callback required yes/no and why:yes      Best contact number(s):[phone]      Details to clarify the request:fenofibrate (LOFIBRA) 160 mg tablet is a different dosage usually is 134 mg      Ruddy Christian

## 2021-08-27 ENCOUNTER — TELEPHONE (OUTPATIENT)
Dept: FAMILY MEDICINE CLINIC | Age: 66
End: 2021-08-27

## 2021-08-27 NOTE — TELEPHONE ENCOUNTER
----- Message from [employer](Mosen)es sent at 8/27/2021  8:37 AM EDT -----  Regarding: JENNY Cobos/Telephone  General Message/Vendor Calls    Caller's first and last name: pt      Reason for call: Question      Callback required yes/no and why: yes, please      Best contact number(s):  [phone]      Details to clarify the request: Patient stated he picked up his prescription for fenofibrate (LOFIBRA) 160 mg tablet and he stated it use to be 134 mg and now it is 160 mg and he would like to know why it was changed.           [employer]es

## 2021-08-29 DIAGNOSIS — E78.2 MIXED HYPERLIPIDEMIA: Primary | ICD-10-CM

## 2021-08-29 NOTE — TELEPHONE ENCOUNTER
Tory Manzo increased to 160 mg due to most recent cholesterol levels . Please ask him to make an appointment to recheck cholesterol in 3 months to check on th effectiveness of the new dose. Thanks!

## 2021-08-30 NOTE — TELEPHONE ENCOUNTER
[facility] Mr. Chance Kaba, he was informed of Susy's advice, stated OK and thanks. Mr. Chance Kaba has planned to come back in January for his 6 months follow up, so I stated to him, I can check with Jake Senters to see if he can wait until then for the lab BE follow up, but continue on that dose until then. He stated, yes he prefers to do that. Mr. Chance Kaba was called, informed per Jake Senters, he can wait until January. He stated once again OK and thanks.

## 2021-11-04 ENCOUNTER — TELEPHONE (OUTPATIENT)
Dept: FAMILY MEDICINE CLINIC | Age: 66
End: 2021-11-04

## 2021-11-04 RX ORDER — VERAPAMIL HYDROCHLORIDE 240 MG/1
TABLET, FILM COATED, EXTENDED RELEASE ORAL
Qty: 180 TABLET | Refills: 3 | Status: CANCELLED | OUTPATIENT
Start: 2021-11-04

## 2021-11-04 RX ORDER — VERAPAMIL HYDROCHLORIDE 240 MG/1
TABLET, FILM COATED, EXTENDED RELEASE ORAL
Qty: 180 TABLET | Refills: 3 | Status: SHIPPED | OUTPATIENT
Start: 2021-11-04

## 2021-11-16 ENCOUNTER — OFFICE VISIT (OUTPATIENT)
Dept: FAMILY MEDICINE CLINIC | Age: 66
End: 2021-11-16
Payer: MEDICARE

## 2021-11-16 DIAGNOSIS — Z13.1 SCREENING FOR DIABETES MELLITUS: ICD-10-CM

## 2021-11-16 DIAGNOSIS — Z23 NEEDS FLU SHOT: Primary | ICD-10-CM

## 2021-11-16 DIAGNOSIS — R79.9 ABNORMAL FINDING OF BLOOD CHEMISTRY, UNSPECIFIED: ICD-10-CM

## 2021-11-16 DIAGNOSIS — Z23 ENCOUNTER FOR IMMUNIZATION: ICD-10-CM

## 2021-11-16 DIAGNOSIS — Z12.11 SCREENING FOR COLON CANCER: ICD-10-CM

## 2021-11-16 DIAGNOSIS — E78.00 HYPERCHOLESTEROLEMIA: ICD-10-CM

## 2021-11-16 DIAGNOSIS — E78.2 MIXED HYPERLIPIDEMIA: ICD-10-CM

## 2021-11-16 DIAGNOSIS — N18.31 STAGE 3A CHRONIC KIDNEY DISEASE (HCC): ICD-10-CM

## 2021-11-16 DIAGNOSIS — I10 ESSENTIAL HYPERTENSION, BENIGN: ICD-10-CM

## 2021-11-16 LAB
ALBUMIN SERPL-MCNC: 4.3 G/DL (ref 3.5–5)
ALBUMIN/GLOB SERPL: 1.3 {RATIO} (ref 1.1–2.2)
ALP SERPL-CCNC: 32 U/L (ref 45–117)
ALT SERPL-CCNC: 23 U/L (ref 12–78)
ANION GAP SERPL CALC-SCNC: 10 MMOL/L (ref 5–15)
AST SERPL-CCNC: 13 U/L (ref 15–37)
BASOPHILS # BLD: 0.1 K/UL (ref 0–0.1)
BASOPHILS NFR BLD: 1 % (ref 0–1)
BILIRUB SERPL-MCNC: 0.4 MG/DL (ref 0.2–1)
BUN SERPL-MCNC: 19 MG/DL (ref 6–20)
BUN/CREAT SERPL: 14 (ref 12–20)
CALCIUM SERPL-MCNC: 10.2 MG/DL (ref 8.5–10.1)
CHLORIDE SERPL-SCNC: 105 MMOL/L (ref 97–108)
CHOLEST SERPL-MCNC: 175 MG/DL
CO2 SERPL-SCNC: 25 MMOL/L (ref 21–32)
CREAT SERPL-MCNC: 1.35 MG/DL (ref 0.7–1.3)
DIFFERENTIAL METHOD BLD: ABNORMAL
EOSINOPHIL # BLD: 0.1 K/UL (ref 0–0.4)
EOSINOPHIL NFR BLD: 2 % (ref 0–7)
ERYTHROCYTE [DISTWIDTH] IN BLOOD BY AUTOMATED COUNT: 13.8 % (ref 11.5–14.5)
EST. AVERAGE GLUCOSE BLD GHB EST-MCNC: 120 MG/DL
GLOBULIN SER CALC-MCNC: 3.3 G/DL (ref 2–4)
GLUCOSE SERPL-MCNC: 123 MG/DL (ref 65–100)
HBA1C MFR BLD: 5.8 % (ref 4–5.6)
HCT VFR BLD AUTO: 36.7 % (ref 36.6–50.3)
HDLC SERPL-MCNC: 49 MG/DL
HDLC SERPL: 3.6 {RATIO} (ref 0–5)
HGB BLD-MCNC: 12.3 G/DL (ref 12.1–17)
IMM GRANULOCYTES # BLD AUTO: 0 K/UL (ref 0–0.04)
IMM GRANULOCYTES NFR BLD AUTO: 0 % (ref 0–0.5)
LDLC SERPL CALC-MCNC: 103.4 MG/DL (ref 0–100)
LYMPHOCYTES # BLD: 1.5 K/UL (ref 0.8–3.5)
LYMPHOCYTES NFR BLD: 36 % (ref 12–49)
MCH RBC QN AUTO: 30.8 PG (ref 26–34)
MCHC RBC AUTO-ENTMCNC: 33.5 G/DL (ref 30–36.5)
MCV RBC AUTO: 92 FL (ref 80–99)
MONOCYTES # BLD: 0.4 K/UL (ref 0–1)
MONOCYTES NFR BLD: 10 % (ref 5–13)
NEUTS SEG # BLD: 2.2 K/UL (ref 1.8–8)
NEUTS SEG NFR BLD: 51 % (ref 32–75)
NRBC # BLD: 0 K/UL (ref 0–0.01)
NRBC BLD-RTO: 0 PER 100 WBC
PLATELET # BLD AUTO: 327 K/UL (ref 150–400)
PMV BLD AUTO: 10.4 FL (ref 8.9–12.9)
POTASSIUM SERPL-SCNC: 3.7 MMOL/L (ref 3.5–5.1)
PROT SERPL-MCNC: 7.6 G/DL (ref 6.4–8.2)
RBC # BLD AUTO: 3.99 M/UL (ref 4.1–5.7)
SODIUM SERPL-SCNC: 140 MMOL/L (ref 136–145)
TRIGL SERPL-MCNC: 113 MG/DL (ref ?–150)
VLDLC SERPL CALC-MCNC: 22.6 MG/DL
WBC # BLD AUTO: 4.3 K/UL (ref 4.1–11.1)

## 2021-11-16 PROCEDURE — 90732 PPSV23 VACC 2 YRS+ SUBQ/IM: CPT | Performed by: NURSE PRACTITIONER

## 2021-11-16 PROCEDURE — 36415 COLL VENOUS BLD VENIPUNCTURE: CPT | Performed by: NURSE PRACTITIONER

## 2021-11-16 PROCEDURE — G0008 ADMIN INFLUENZA VIRUS VAC: HCPCS | Performed by: NURSE PRACTITIONER

## 2021-11-16 PROCEDURE — G0009 ADMIN PNEUMOCOCCAL VACCINE: HCPCS | Performed by: NURSE PRACTITIONER

## 2021-11-16 PROCEDURE — 90694 VACC AIIV4 NO PRSRV 0.5ML IM: CPT | Performed by: NURSE PRACTITIONER

## 2021-11-16 PROCEDURE — 99214 OFFICE O/P EST MOD 30 MIN: CPT | Performed by: NURSE PRACTITIONER

## 2021-11-16 NOTE — PROGRESS NOTES
1. Have you been to the ER, urgent care clinic since your last visit? Hospitalized since your last visit? No    2. Have you seen or consulted any other health care providers outside of the [address] [place] [address] since your last visit? Include any pap smears or colon screening.  No      Chief Complaint   Patient presents with    Hypertension         Visit Vitals  BP (!) 140/66 (BP 1 Location: Left upper arm, BP Patient Position: Sitting)   Pulse 89   Temp 98 °F (36.7 °C) (Temporal)   Resp 16   Ht 5' 4\" (1.626 m)   Wt 173 lb 6.4 oz (78.7 kg)   SpO2 98%   BMI 29.76 kg/m²       Pain Scale: 0 - No pain/10  Pain Location:

## 2021-11-17 VITALS
TEMPERATURE: 98 F | RESPIRATION RATE: 16 BRPM | HEIGHT: 64 IN | DIASTOLIC BLOOD PRESSURE: 60 MMHG | OXYGEN SATURATION: 98 % | BODY MASS INDEX: 29.6 KG/M2 | SYSTOLIC BLOOD PRESSURE: 128 MMHG | HEART RATE: 89 BPM | WEIGHT: 173.4 LBS

## 2021-11-18 NOTE — PROGRESS NOTES
Subjective:     Glenn Bolanos is a 73 y.o. male who presents today with the following:  Chief Complaint   Patient presents with    Hypertension       Patient Active Problem List   Diagnosis Code    Hyperlipemia E78.5    Essential hypertension, benign I10    CKD (chronic kidney disease) stage 3, GFR 30-59 ml/min (Formerly [facility]) N18.30         COMPLIANT WITH MEDICATION:   HTN; Denies chest pain, dyspnea, palpitations, headache and blurred vision. Blood pressure normotensive. depression screening addressed not at risk    abuse screening addressed denies    learning assessment addressed reviewed nurses notes    fall risk addressed not at risk    HM: addressed  Flu vaccine today    ROS:  Gen: denies fever, chills, fatigue, weight loss, weight gain  HEENT:denies blurry vision, nasal congestion, sore throat  Resp: denies dypsnea, cough, wheezing  CV: denies chest pain radiating to the jaws or arms, palpitations, lower extremity edema  Abd: denies nausea, vomiting, diarrhea, constipation  Neuro: denies numbness/tingling  Endo: denies polyuria, polydipsia, heat/cold intolerance  Heme: no lymphadenopathy    No Known Allergies      Current Outpatient Medications:     verapamil ER (CALAN-SR) 240 mg CR tablet, TAKE 2 TABLETS BY MOUTH DAILY  Indications: high blood pressure, Disp: 180 Tablet, Rfl: 3    fenofibrate (LOFIBRA) 160 mg tablet, Take 1 Tablet by mouth daily. , Disp: 90 Tablet, Rfl: 3    lisinopril-hydroCHLOROthiazide (PRINZIDE, ZESTORETIC) 20-25 mg per tablet, TAKE 1 TABLET BY MOUTH EVERY DAY, Disp: 90 Tablet, Rfl: 3    ferrous sulfate (Slow Fe) 142 mg (45 mg iron) ER tablet, Take 1 Tablet by mouth Daily (before breakfast). , Disp: 90 Tablet, Rfl: 1    aluminum-magnesium hydroxide (MAALOX) 200-200 mg/5 mL suspension, Take 15 mL by mouth every six (6) hours as needed for Indigestion. , Disp: 30 mL, Rfl: 3    Past Medical History:   Diagnosis Date    CKD (chronic kidney disease) stage 3, GFR 30-59 ml/min (Formerly [facility])     Hypertension     Hypopotassemia 2009    Other and unspecified hyperlipidemia 2008       History reviewed. No pertinent surgical history. Social History     Tobacco Use   Smoking Status Never Smoker   Smokeless Tobacco Never Used       Social History     Socioeconomic History    Marital status: UNKNOWN   Tobacco Use    Smoking status: Never Smoker    Smokeless tobacco: Never Used   Vaping Use    Vaping Use: Never used   Substance and Sexual Activity    Alcohol use: No       Family History   Problem Relation Age of Onset    Hypertension Mother          Objective:     Visit Vitals  /60 (BP 1 Location: Right arm, BP Patient Position: Sitting, BP Cuff Size: Adult)   Pulse 89   Temp 98 °F (36.7 °C) (Temporal)   Resp 16   Ht 5' 4\" (1.626 m)   Wt 173 lb 6.4 oz (78.7 kg)   SpO2 98%   BMI 29.76 kg/m²     Body mass index is 29.76 kg/m². General: Alert and oriented. No acute distress. Well nourished  HEENT :  Ears:TMs are normal. Canals are clear. Eyes: pupils equal, round, react to light and accommodation. Extra ocular movements intact. Nose: patent. Mouth and throat is clear. Neck:supple full range of motion no thyromegaly. Trachea midline, No carotid bruits. No significant lymphadenopathy  Lungs[de-identified] clear to auscultation without wheezes, rales, or rhonchi. Heart :RRR, S1 & S2 are normal intensity. No murmur; no gallop  Abdomen: bowel sounds active. No tenderness, guarding, rebound, masses, hepatic or spleen enlargement  Back: no CVA tenderness. Extremities: without clubbing, cyanosis, or edema  Pulses: radial and femoral pulses are normal  Neuro: HMF intact. Cranial nerves II through XII grossly normal.  Motor: is 5 over 5 and symmetrical.   Deep tendon reflexes: +2 equal  Psych:appropriate behavior, mood, affect and judgement.    Results for orders placed or performed in visit on 11/16/21   HEMOGLOBIN A1C WITH EAG   Result Value Ref Range    Hemoglobin A1c 5.8 (H) 4.0 - 5.6 %    Est. average glucose 120 mg/dL   METABOLIC PANEL, COMPREHENSIVE   Result Value Ref Range    Sodium 140 136 - 145 mmol/L    Potassium 3.7 3.5 - 5.1 mmol/L    Chloride 105 97 - 108 mmol/L    CO2 25 21 - 32 mmol/L    Anion gap 10 5 - 15 mmol/L    Glucose 123 (H) 65 - 100 mg/dL    BUN 19 6 - 20 MG/DL    Creatinine 1.35 (H) 0.70 - 1.30 MG/DL    BUN/Creatinine ratio 14 12 - 20      GFR est AA >60 >60 ml/min/1.73m2    GFR est non-AA 53 (L) >60 ml/min/1.73m2    Calcium 10.2 (H) 8.5 - 10.1 MG/DL    Bilirubin, total 0.4 0.2 - 1.0 MG/DL    ALT (SGPT) 23 12 - 78 U/L    AST (SGOT) 13 (L) 15 - 37 U/L    Alk. phosphatase 32 (L) 45 - 117 U/L    Protein, total 7.6 6.4 - 8.2 g/dL    Albumin 4.3 3.5 - 5.0 g/dL    Globulin 3.3 2.0 - 4.0 g/dL    A-G Ratio 1.3 1.1 - 2.2     LIPID PANEL   Result Value Ref Range    Cholesterol, total 175 <200 MG/DL    Triglyceride 113 <150 MG/DL    HDL Cholesterol 49 MG/DL    LDL, calculated 103.4 (H) 0 - 100 MG/DL    VLDL, calculated 22.6 MG/DL    CHOL/HDL Ratio 3.6 0.0 - 5.0     CBC WITH AUTOMATED DIFF   Result Value Ref Range    WBC 4.3 4.1 - 11.1 K/uL    RBC 3.99 (L) 4.10 - 5.70 M/uL    HGB 12.3 12.1 - 17.0 g/dL    HCT 36.7 36.6 - 50.3 %    MCV 92.0 80.0 - 99.0 FL    MCH 30.8 26.0 - 34.0 PG    MCHC 33.5 30.0 - 36.5 g/dL    RDW 13.8 11.5 - 14.5 %    PLATELET 035 097 - 148 K/uL    MPV 10.4 8.9 - 12.9 FL    NRBC 0.0 0  WBC    ABSOLUTE NRBC 0.00 0.00 - 0.01 K/uL    NEUTROPHILS 51 32 - 75 %    LYMPHOCYTES 36 12 - 49 %    MONOCYTES 10 5 - 13 %    EOSINOPHILS 2 0 - 7 %    BASOPHILS 1 0 - 1 %    IMMATURE GRANULOCYTES 0 0.0 - 0.5 %    ABS. NEUTROPHILS 2.2 1.8 - 8.0 K/UL    ABS. LYMPHOCYTES 1.5 0.8 - 3.5 K/UL    ABS. MONOCYTES 0.4 0.0 - 1.0 K/UL    ABS. EOSINOPHILS 0.1 0.0 - 0.4 K/UL    ABS. BASOPHILS 0.1 0.0 - 0.1 K/UL    ABS. IMM.  GRANS. 0.0 0.00 - 0.04 K/UL    DF AUTOMATED         Results for orders placed or performed in visit on 11/16/21   HEMOGLOBIN A1C WITH EAG   Result Value Ref Range    Hemoglobin A1c 5.8 (H) 4.0 - 5.6 % Est. average glucose 694 mg/dL   METABOLIC PANEL, COMPREHENSIVE   Result Value Ref Range    Sodium 140 136 - 145 mmol/L    Potassium 3.7 3.5 - 5.1 mmol/L    Chloride 105 97 - 108 mmol/L    CO2 25 21 - 32 mmol/L    Anion gap 10 5 - 15 mmol/L    Glucose 123 (H) 65 - 100 mg/dL    BUN 19 6 - 20 MG/DL    Creatinine 1.35 (H) 0.70 - 1.30 MG/DL    BUN/Creatinine ratio 14 12 - 20      GFR est AA >60 >60 ml/min/1.73m2    GFR est non-AA 53 (L) >60 ml/min/1.73m2    Calcium 10.2 (H) 8.5 - 10.1 MG/DL    Bilirubin, total 0.4 0.2 - 1.0 MG/DL    ALT (SGPT) 23 12 - 78 U/L    AST (SGOT) 13 (L) 15 - 37 U/L    Alk. phosphatase 32 (L) 45 - 117 U/L    Protein, total 7.6 6.4 - 8.2 g/dL    Albumin 4.3 3.5 - 5.0 g/dL    Globulin 3.3 2.0 - 4.0 g/dL    A-G Ratio 1.3 1.1 - 2.2     LIPID PANEL   Result Value Ref Range    Cholesterol, total 175 <200 MG/DL    Triglyceride 113 <150 MG/DL    HDL Cholesterol 49 MG/DL    LDL, calculated 103.4 (H) 0 - 100 MG/DL    VLDL, calculated 22.6 MG/DL    CHOL/HDL Ratio 3.6 0.0 - 5.0     CBC WITH AUTOMATED DIFF   Result Value Ref Range    WBC 4.3 4.1 - 11.1 K/uL    RBC 3.99 (L) 4.10 - 5.70 M/uL    HGB 12.3 12.1 - 17.0 g/dL    HCT 36.7 36.6 - 50.3 %    MCV 92.0 80.0 - 99.0 FL    MCH 30.8 26.0 - 34.0 PG    MCHC 33.5 30.0 - 36.5 g/dL    RDW 13.8 11.5 - 14.5 %    PLATELET 091 064 - 250 K/uL    MPV 10.4 8.9 - 12.9 FL    NRBC 0.0 0  WBC    ABSOLUTE NRBC 0.00 0.00 - 0.01 K/uL    NEUTROPHILS 51 32 - 75 %    LYMPHOCYTES 36 12 - 49 %    MONOCYTES 10 5 - 13 %    EOSINOPHILS 2 0 - 7 %    BASOPHILS 1 0 - 1 %    IMMATURE GRANULOCYTES 0 0.0 - 0.5 %    ABS. NEUTROPHILS 2.2 1.8 - 8.0 K/UL    ABS. LYMPHOCYTES 1.5 0.8 - 3.5 K/UL    ABS. MONOCYTES 0.4 0.0 - 1.0 K/UL    ABS. EOSINOPHILS 0.1 0.0 - 0.4 K/UL    ABS. BASOPHILS 0.1 0.0 - 0.1 K/UL    ABS. IMM. GRANS. 0.0 0.00 - 0.04 K/UL    DF AUTOMATED         Assessment/ Plan:     1. Needs flu shot    - FLU (FLUAD QUAD INFLUENZA VACCINE,QUAD,ADJUVANTED)      2.  Essential hypertension, benign  BP in goal rechecked during visit  Continue BP meds as listed above  - CBC WITH AUTOMATED DIFF; Future  - LIPID PANEL; Future  - METABOLIC PANEL, COMPREHENSIVE; Future  - COLLECTION VENOUS BLOOD,VENIPUNCTURE; Future  - COLLECTION VENOUS BLOOD,VENIPUNCTURE  - METABOLIC PANEL, COMPREHENSIVE  - LIPID PANEL  - CBC WITH AUTOMATED DIFF    3. Hypercholesterolemia  Continue fenofibrate as listed above. - CBC WITH AUTOMATED DIFF; Future  - LIPID PANEL; Future  - METABOLIC PANEL, COMPREHENSIVE; Future  - COLLECTION VENOUS BLOOD,VENIPUNCTURE; Future  - COLLECTION VENOUS BLOOD,VENIPUNCTURE  - METABOLIC PANEL, COMPREHENSIVE  - LIPID PANEL  - CBC WITH AUTOMATED DIFF    4. Encounter for immunization    - PNEUMOCOCCAL POLYSACCHARIDE VACCINE, 23-VALENT, ADULT OR IMMUNOSUPPRESSED PT DOSE,  - ADMIN PNEUMOCOCCAL VACCINE    5. Stage 3a chronic kidney disease (HCC)  - COLLECTION VENOUS BLOOD,VENIPUNCTURE  - METABOLIC PANEL, COMPREHENSIVE  6. Screening for diabetes mellitus    - HEMOGLOBIN A1C WITH EAG; Future  - HEMOGLOBIN A1C WITH EAG    7. Abnormal finding of blood chemistry, unspecified     - HEMOGLOBIN A1C WITH EAG; Future  - HEMOGLOBIN A1C WITH EAG    8.  Mixed hyperlipidemia  - lipid panel  - COLLECTION VENOUS BLOOD,VENIPUNCTURE      Orders Placed This Encounter    COLLECTION VENOUS BLOOD,VENIPUNCTURE     Standing Status:   Future     Number of Occurrences:   1     Standing Expiration Date:   12/16/2021    Influenza Vaccine, QUAD, 65 Yrs +  IM  (Fluad 95796 )    PNEUMOCOCCAL POLYSACCHARIDE VACCINE, 23-VALENT, ADULT OR IMMUNOSUPPRESSED PT DOSE,    CBC WITH AUTOMATED DIFF     Standing Status:   Future     Number of Occurrences:   1     Standing Expiration Date:   12/16/2021    LIPID PANEL     Standing Status:   Future     Number of Occurrences:   1     Standing Expiration Date:   67/30/0839    METABOLIC PANEL, COMPREHENSIVE     Standing Status:   Future     Number of Occurrences:   1     Standing Expiration Date:   12/16/2021    HEMOGLOBIN A1C WITH EAG     Standing Status:   Future     Number of Occurrences:   1     Standing Expiration Date:   11/16/2022    ADMIN PNEUMOCOCCAL VACCINE         Verbal and written instructions (see AVS) provided. Patient expresses understanding of diagnosis and treatment plan.     Health Maintenance Due   Topic Date Due    COVID-19 Vaccine (3 - Booster for Moderna series) 08/24/2021    Colorectal Cancer Screening Combo  09/08/2021     Follow up in 6 months or sooner if needed          CATHERINE Douglas

## 2021-11-19 NOTE — PROGRESS NOTES
Patient verified by stating name and date of birth.  Patient informed of lab results and states understanding per Dawit Florentino

## 2021-11-19 NOTE — PROGRESS NOTES
Excellent news HGBA1C down to 5.8  Metabolic panel stable liver and kidney function.     Great job on cholesterol levels  CBC no anemia

## 2021-11-23 ENCOUNTER — TELEPHONE (OUTPATIENT)
Dept: FAMILY MEDICINE CLINIC | Age: 66
End: 2021-11-23

## 2021-11-23 DIAGNOSIS — E78.00 HYPERCHOLESTEROLEMIA: ICD-10-CM

## 2021-11-23 DIAGNOSIS — I10 ESSENTIAL HYPERTENSION, BENIGN: ICD-10-CM

## 2021-11-23 RX ORDER — FENOFIBRATE 160 MG/1
160 TABLET ORAL DAILY
Qty: 90 TABLET | Refills: 3 | Status: SHIPPED | OUTPATIENT
Start: 2021-11-23 | End: 2022-03-03 | Stop reason: SDUPTHER

## 2021-11-23 RX ORDER — LISINOPRIL AND HYDROCHLOROTHIAZIDE 20; 25 MG/1; MG/1
TABLET ORAL
Qty: 90 TABLET | Refills: 3 | Status: SHIPPED | OUTPATIENT
Start: 2021-11-23

## 2021-11-23 NOTE — TELEPHONE ENCOUNTER
Pt saw Nikita Ran on 11.16.2021. States that [facility] Night did not send in his refills for  Fenofibrate and Lisinopril. Uses [id number]. Only has 3 left.

## 2021-12-01 LAB — HEMOCCULT STL QL IA: NEGATIVE

## 2022-01-11 ENCOUNTER — HOSPITAL ENCOUNTER (EMERGENCY)
Age: 67
Discharge: HOME | End: 2022-01-11
Attending: EMERGENCY MEDICINE
Payer: MEDICARE

## 2022-01-11 VITALS
SYSTOLIC BLOOD PRESSURE: 177 MMHG | BODY MASS INDEX: 29.88 KG/M2 | DIASTOLIC BLOOD PRESSURE: 86 MMHG | RESPIRATION RATE: 16 BRPM | WEIGHT: 175 LBS | HEIGHT: 64 IN | OXYGEN SATURATION: 100 % | TEMPERATURE: 98.7 F | HEART RATE: 83 BPM

## 2022-01-11 DIAGNOSIS — K64.9 HEMORRHOIDS, UNSPECIFIED HEMORRHOID TYPE: Primary | ICD-10-CM

## 2022-01-11 LAB — HEMOCCULT STL QL: NEGATIVE

## 2022-01-11 PROCEDURE — 82272 OCCULT BLD FECES 1-3 TESTS: CPT

## 2022-01-11 PROCEDURE — 99282 EMERGENCY DEPT VISIT SF MDM: CPT

## 2022-01-11 RX ORDER — PRAMOXINE HYDROCHLORIDE HYDROCORTISONE ACETATE 100; 100 MG/10G; MG/10G
1 AEROSOL, FOAM TOPICAL 2 TIMES DAILY
Qty: 1 APPLICATOR | Refills: 0 | Status: SHIPPED | OUTPATIENT
Start: 2022-01-11

## 2022-01-11 RX ORDER — DIBUCAINE 0.28 G/28G
OINTMENT TOPICAL
Qty: 28 G | Refills: 0 | Status: SHIPPED | OUTPATIENT
Start: 2022-01-11

## 2022-01-11 NOTE — ED TRIAGE NOTES
States intermittent rectal pain x 4 days, about 1 hr ago had spot of blood in toilet, stool normal greenish brown color

## 2022-01-11 NOTE — ED PROVIDER NOTES
EMERGENCY DEPARTMENT HISTORY AND PHYSICAL EXAM      Date: 1/11/2022  Patient Name: Jessica Verma    History of Presenting Illness     Chief Complaint   Patient presents with    Anal Pain       History Provided By: Patient    HPI:   The history is provided by the patient. Rectal Bleeding   This is a new problem. The current episode started 1 to 2 hours ago. The stool is described as blood tinged. Pertinent negatives include no abdominal pain, no dysuria, no abdominal distention, no chills, no fever, no nausea, no back pain, no vomiting, no diarrhea and no constipation. He has tried nothing for the symptoms. The treatment provided no relief. Jessica Verma, 73 y.o. male  presents to the ED with cc of rectal pain times several days with an episode of blood on the toilet paper with wiping tonight after the bowel movement. Bowel movement was nonbloody and non melanotic. Patient did not see any blood in his toilet. He reports a history of hemorrhoids. He denies any abdominal pain nausea vomiting diarrhea. Denies any straining or hard stools. He had no pain with a bowel movement. He denies any fevers chills UTI symptoms. Patient denies any lightheadedness dizziness near syncope symptom    There are no other complaints, changes, or physical findings at this time. PCP: Kristofer Marrero NP    No current facility-administered medications on file prior to encounter. Current Outpatient Medications on File Prior to Encounter   Medication Sig Dispense Refill    PREPARATION H HYDROCORTISONE EX by Apply Externally route as needed.  fenofibrate (LOFIBRA) 160 mg tablet Take 1 Tablet by mouth daily.  90 Tablet 3    lisinopril-hydroCHLOROthiazide (PRINZIDE, ZESTORETIC) 20-25 mg per tablet TAKE 1 TABLET BY MOUTH EVERY DAY 90 Tablet 3    verapamil ER (CALAN-SR) 240 mg CR tablet TAKE 2 TABLETS BY MOUTH DAILY  Indications: high blood pressure 180 Tablet 3    ferrous sulfate (Slow Fe) 142 mg (45 mg iron) ER tablet Take 1 Tablet by mouth Daily (before breakfast). 90 Tablet 1    aluminum-magnesium hydroxide (MAALOX) 200-200 mg/5 mL suspension Take 15 mL by mouth every six (6) hours as needed for Indigestion. 30 mL 3       Past History     Past Medical History:  Past Medical History:   Diagnosis Date    CKD (chronic kidney disease) stage 3, GFR 30-59 ml/min ([facility]ly [facility])     Hypertension     Hypopotassemia 2009    Other and unspecified hyperlipidemia 2008       Past Surgical History:  No past surgical history on file. Family History:  Family History   Problem Relation Age of Onset    Hypertension Mother        Social History:  Social History     Tobacco Use    Smoking status: Never Smoker    Smokeless tobacco: Never Used   Vaping Use    Vaping Use: Never used   Substance Use Topics    Alcohol use: No    Drug use: Not on file       Allergies:  No Known Allergies      Review of Systems   Review of Systems   Constitutional: Negative. Negative for chills and fever. HENT: Negative. Negative for congestion and sore throat. Eyes: Negative. Negative for discharge and redness. Respiratory: Negative. Negative for cough and shortness of breath. Cardiovascular: Negative. Negative for chest pain and palpitations. Gastrointestinal: Positive for anal bleeding. Negative for abdominal distention, abdominal pain, constipation, diarrhea, nausea and vomiting. Genitourinary: Negative. Negative for dysuria, flank pain and frequency. Musculoskeletal: Negative. Negative for arthralgias, back pain and neck pain. Skin: Negative. Negative for rash and wound. Allergic/Immunologic: Negative. Neurological: Negative. Negative for dizziness, syncope, weakness and headaches. Hematological: Negative. Negative for adenopathy. Does not bruise/bleed easily. Psychiatric/Behavioral: Negative. Negative for confusion. The patient is not nervous/anxious. All other systems reviewed and are negative.       Physical Exam Physical Exam  Vitals and nursing note reviewed. Constitutional:       General: He is not in acute distress. Appearance: Normal appearance. He is well-developed. He is not ill-appearing, toxic-appearing or diaphoretic. HENT:      Head: Normocephalic and atraumatic. Nose: Nose normal.      Mouth/Throat:      Mouth: Mucous membranes are moist.      Pharynx: Oropharynx is clear. Eyes:      Extraocular Movements: Extraocular movements intact. Conjunctiva/sclera: Conjunctivae normal.      Pupils: Pupils are equal, round, and reactive to light. Cardiovascular:      Rate and Rhythm: Normal rate and regular rhythm. Pulses: Normal pulses. Pulmonary:      Effort: Pulmonary effort is normal. No respiratory distress. Abdominal:      General: There is no distension. Palpations: Abdomen is soft. Genitourinary:     Penis: Normal.       Rectum: Guaiac result negative. External hemorrhoid and internal hemorrhoid present. No anal fissure. Comments: Suspect anal stenosis  Musculoskeletal:         General: No swelling or tenderness. Normal range of motion. Cervical back: Normal range of motion and neck supple. No rigidity. No muscular tenderness. Skin:     General: Skin is warm and dry. Capillary Refill: Capillary refill takes less than 2 seconds. Findings: No erythema, lesion or rash. Neurological:      General: No focal deficit present. Mental Status: He is alert and oriented to person, place, and time. Mental status is at baseline. Cranial Nerves: No cranial nerve deficit. Sensory: No sensory deficit. Psychiatric:         Mood and Affect: Mood normal.         Behavior: Behavior normal.         Thought Content: Thought content normal.         Judgment: Judgment normal.         Diagnostic Study Results     Labs -   No results found for this or any previous visit (from the past 12 hour(s)).     Radiologic Studies -   No orders to display     CT Results (Last 48 hours)    None        CXR Results  (Last 48 hours)    None          Medical Decision Making   I am the first provider for this patient. I reviewed the vital signs, available nursing notes, past medical history, past surgical history, family history and social history. Vital Signs-Reviewed the patient's vital signs. Patient Vitals for the past 12 hrs:   Temp Pulse Resp BP SpO2   01/11/22 0204 98.7 °F (37.1 °C) 83 16 (!) 177/86 100 %                 Records Reviewed: Nursing Notes and Old Medical Records    Provider Notes (Medical Decision Making):   Patient presents complaining of bright red blood with wiping, he has hemorrhoids on exam not actively bleeding. Will recommend a topical cream and refer to GI surgery for follow-up    ED Course:   Initial assessment performed. The patients presenting problems have been discussed, and they are in agreement with the care plan formulated and outlined with them. I have encouraged them to ask questions as they arise throughout their visit. Medications Given in the ED:    Medications - No data to display        2:25 AM    Patient with bright red blood per rectum, he has no other complaints, vital signs stable. He has hemorrhoids on exam which is most likely source of bleeding, will give him Proctofoam to take as well as Nupercainal and refer to general surgery here for colonoscopy and/or GI    Pt has been re-examined and states that they are feeling better and have no new complaints. medications,diagnosis, follow up plan and return instructions have been reviewed and discussed with the patient and/or family. Pt and/or family were instructed on symptoms that may arise after discharge requiring re-evaluation by a physician. Pt and/or family have had the opportunity to ask questions about their care. Patient and/or family verbalized understanding and agreement with care plan, follow up and return instructions.  Patient and/or family agree to return in 48 hours if their symptoms are not improving or immediately if they have any change in their condition. I have also put together some discharge instructions for patient that include: 1) educational information regarding their diagnosis, 2) how to care for their diagnosis at home, as well a 3) list of reasons why they would want to return to the ED prior to their follow-up appointment, should their condition change. Ronnell Ga MD      Procedures          Disposition:    Discharged      DISCHARGE PLAN:  1. Current Discharge Medication List      START taking these medications    Details   hydrocortisone-pramoxine (Proctofoam HC) rectal foam Insert 1 Applicator into rectum two (2) times a day. Qty: 1 Applicator, Refills: 0  Start date: 1/11/2022      dibucaine (Nupercainal) 1 % rectal ointment by PeriANAL route every four (4) hours as needed for Pain. Qty: 28 g, Refills: 0  Start date: 1/11/2022           2. Follow-up Information     Follow up With Specialties Details Why Contact Info    Urbano Hoyos NP Nurse Practitioner Schedule an appointment as soon as possible for a visit in 2 days For follow up [facility]      Chace Ferguson MD General Surgery Schedule an appointment as soon as possible for a visit in 2 days For follow up [address]      Mason Hartman MD Gastroenterology Schedule an appointment as soon as possible for a visit  For follow up [address]  [phone]          3. Return to ED if worse     Diagnosis     Clinical Impression:   1. Hemorrhoids, unspecified hemorrhoid type        Attestations: Ronnell Ga MD    Please note that this dictation was completed with [employer], the [employer] voice recognition software.   Quite often unanticipated grammatical, syntax, homophones, and other interpretive errors are inadvertently transcribed by the computer software. Please disregard these errors. Please excuse any errors that have escaped final proofreading. Thank you.

## 2022-01-11 NOTE — ED NOTES
Assisted Dr Nella Nevarez with rectal exam, pt noted to have internal hemerhoids but no active bleeding.     quiac specimen sent

## 2022-01-24 ENCOUNTER — OFFICE VISIT (OUTPATIENT)
Dept: FAMILY MEDICINE CLINIC | Age: 67
End: 2022-01-24
Payer: MEDICARE

## 2022-01-24 VITALS
WEIGHT: 178.4 LBS | SYSTOLIC BLOOD PRESSURE: 126 MMHG | BODY MASS INDEX: 30.46 KG/M2 | DIASTOLIC BLOOD PRESSURE: 68 MMHG | OXYGEN SATURATION: 99 % | HEART RATE: 88 BPM | HEIGHT: 64 IN | RESPIRATION RATE: 16 BRPM | TEMPERATURE: 97.8 F

## 2022-01-24 DIAGNOSIS — K64.9 HEMORRHOIDS, UNSPECIFIED HEMORRHOID TYPE: ICD-10-CM

## 2022-01-24 DIAGNOSIS — N18.31 STAGE 3A CHRONIC KIDNEY DISEASE (HCC): ICD-10-CM

## 2022-01-24 DIAGNOSIS — K59.00 CONSTIPATION, UNSPECIFIED CONSTIPATION TYPE: Primary | ICD-10-CM

## 2022-01-24 PROCEDURE — 99214 OFFICE O/P EST MOD 30 MIN: CPT | Performed by: NURSE PRACTITIONER

## 2022-01-24 NOTE — ACP (ADVANCE CARE PLANNING)
Discussed importance of advanced medical directives with patient. Patient is capable of making decisions. Davey Pena NP-C

## 2022-01-24 NOTE — PROGRESS NOTES
Identified pt with two pt identifiers(name and ). Reviewed record in preparation for visit and have obtained necessary documentation. Chief Complaint   Patient presents with    Follow-up     ER follow up       There were no vitals filed for this visit. Health Maintenance Due   Topic    COVID-19 Vaccine (3 - Booster for Moderna series)     Health Maintenance Review: Patient reminded of \"due or due soon\" health maintenance. I have asked the patient to contact his/her primary care provider (PCP) for follow-up on his/her health maintenance. Coordination of Care Questionnaire:  :   1) Have you been to an emergency room, urgent care, or hospitalized since your last visit? If yes, where when, and reason for visit? Yes, ER     2. Have seen or consulted any other health care provider since your last visit? If yes, where when, and reason for visit? NO      3) Do you have an Advanced Directive/ Living Will in place? NO  If yes, do we have a copy on file NO  If no, would you like information NO    Patient is accompanied by self I have received verbal consent from Patricia Ribeiro to discuss any/all medical information while they are present in the room.

## 2022-01-24 NOTE — PATIENT INSTRUCTIONS
Constipation: Care Instructions  Your Care Instructions     Constipation means that you have a hard time passing stools (bowel movements). People pass stools from 3 times a day to once every 3 days. What is normal for you may be different. Constipation may occur with pain in the rectum and cramping. The pain may get worse when you try to pass stools. Sometimes there are small amounts of bright red blood on toilet paper or the surface of stools. This is because of enlarged veins near the rectum (hemorrhoids). A few changes in your diet and lifestyle may help you avoid ongoing constipation. Your doctor may also prescribe medicine to help loosen your stool. Some medicines can cause constipation. These include pain medicines and antidepressants. Tell your doctor about all the medicines you take. Your doctor may want to make a medicine change to ease your symptoms. Follow-up care is a key part of your treatment and safety. Be sure to make and go to all appointments, and call your doctor if you are having problems. It's also a good idea to know your test results and keep a list of the medicines you take. How can you care for yourself at home? · Drink plenty of fluids. If you have kidney, heart, or liver disease and have to limit fluids, talk with your doctor before you increase the amount of fluids you drink. · Include high-fiber foods in your diet each day. These include fruits, vegetables, beans, and whole grains. · Get at least 30 minutes of exercise on most days of the week. Walking is a good choice. You also may want to do other activities, such as running, swimming, cycling, or playing tennis or team sports. · Take a fiber supplement, such as Citrucel or Metamucil, every day. Read and follow all instructions on the label. · Schedule time each day for a bowel movement. A daily routine may help. Take your time having your bowel movement.   · Support your feet with a small step stool when you sit on the toilet. This helps flex your hips and places your pelvis in a squatting position. · Your doctor may recommend an over-the-counter laxative to relieve your constipation. Examples are Milk of Magnesia and MiraLax. Read and follow all instructions on the label. Do not use laxatives on a long-term basis. When should you call for help? Call your doctor now or seek immediate medical care if:    · You have new or worse belly pain.     · You have new or worse nausea or vomiting.     · You have blood in your stools. Watch closely for changes in your health, and be sure to contact your doctor if:    · Your constipation is getting worse.     · You do not get better as expected. Where can you learn more? Go to http://www.menjivar.com/  Enter P343 in the search box to learn more about \"Constipation: Care Instructions. \"  Current as of: July 1, 2021               Content Version: 13.0  © 4765-4806 [employer]. Care instructions adapted under license by [employer] (which disclaims liability or warranty for this information). If you have questions about a medical condition or this instruction, always ask your healthcare professional. [place] [address] any warranty or liability for your use of this information. Hemorrhoids: Care Instructions  Overview     Hemorrhoids are swollen veins that develop in the anal canal. Bleeding during bowel movements, itching, and rectal pain are the most common symptoms. Hemorrhoids can be uncomfortable at times, but rarely are they a serious problem. Most of the time, you can treat them with simple changes to your diet and bowel habits. These changes include eating more fiber and not straining to pass stools. Most hemorrhoids don't need surgery or other treatment unless they are very large and painful or bleed a lot. Follow-up care is a key part of your treatment and safety.  Be sure to make and go to all appointments, and call your doctor if you are having problems. It's also a good idea to know your test results and keep a list of the medicines you take. How can you care for yourself at home? · Sit in a few inches of warm water (sitz bath) 3 times a day and after bowel movements. The warm water helps with pain and itching. · Put ice on your anal area several times a day for 10 minutes at a time. Put a thin cloth between the ice and your skin. Follow this by placing a warm, wet towel on the area for another 10 to 20 minutes. · Take pain medicines exactly as directed. ? If the doctor gave you a prescription medicine for pain, take it as prescribed. ? If you are not taking a prescription pain medicine, ask your doctor if you can take an over-the-counter medicine. · Keep the anal area clean, but be gentle. Use water and a fragrance-free soap, or use baby wipes or medicated pads such as Tucks. · Wear cotton underwear and loose clothing to decrease moisture in the anal area. · Eat more fiber. Include foods such as whole-grain breads and cereals, raw vegetables, raw and dried fruits, and beans. · Drink plenty of fluids. If you have kidney, heart, or liver disease and have to limit fluids, talk with your doctor before you increase the amount of fluids you drink. · Use a stool softener that contains bran or psyllium. You can save money by buying bran or psyllium (available in bulk at most health food stores) and sprinkling it on foods or stirring it into fruit juice. Or you can use a product such as Metamucil or Hydrocil. · Practice healthy bowel habits. ? Go to the bathroom as soon as you have the urge. ? Avoid straining to pass stools. Relax and give yourself time to let things happen naturally. ? Do not hold your breath while passing stools. ? Do not read while sitting on the toilet. Get off the toilet as soon as you have finished. · Take your medicines exactly as prescribed.  Call your doctor if you think you are having a problem with your medicine. When should you call for help? Call 911 anytime you think you may need emergency care. For example, call if:    · You pass maroon or very bloody stools. Call your doctor now or seek immediate medical care if:    · You have increased pain.     · You have increased bleeding. Watch closely for changes in your health, and be sure to contact your doctor if:    · Your symptoms have not improved after 3 or 4 days. Where can you learn more? Go to http://www.menjivar.com/  Enter F228 in the search box to learn more about \"Hemorrhoids: Care Instructions. \"  Current as of: February 10, 2021               Content Version: 13.0  © 2006-2021 [employer]. Care instructions adapted under license by [employer] (which disclaims liability or warranty for this information). If you have questions about a medical condition or this instruction, always ask your healthcare professional. [address] any warranty or liability for your use of this information.

## 2022-01-24 NOTE — PROGRESS NOTES
Subjective:     Charis Epley is a 73 y.o. male who presents today with the following:  Chief Complaint   Patient presents with    Follow-up     ER follow up 1/11 hemorrhoids and constipation       Patient Active Problem List   Diagnosis Code    Hyperlipemia E78.5    Essential hypertension, benign I10    CKD (chronic kidney disease) stage 3, GFR 30-59 ml/min ([facility]) N18.30         COMPLIANT WITH MEDICATION:   HTN; Denies chest pain, dyspnea, palpitations, headache and blurred vision. Blood pressure normotensive. depression screening addressed not at risk    abuse screening addressed denies    learning assessment addressed reviewed nurses notes    fall risk addressed not at risk    HM: addressed    ROS:  Gen: denies fever, chills, fatigue, weight loss, weight gain  HEENT:denies blurry vision, nasal congestion, sore throat  Resp: denies dypsnea, cough, wheezing  CV: denies chest pain radiating to the jaws or arms, palpitations, lower extremity edema  Abd: denies nausea, vomiting, diarrhea, constipation  Neuro: denies numbness/tingling  Endo: denies polyuria, polydipsia, heat/cold intolerance  Heme: no lymphadenopathy    No Known Allergies      Current Outpatient Medications:     PREPARATION H HYDROCORTISONE EX, by Apply Externally route as needed. , Disp: , Rfl:     hydrocortisone-pramoxine (Proctofoam HC) rectal foam, Insert 1 Applicator into rectum two (2) times a day., Disp: 1 Applicator, Rfl: 0    dibucaine (Nupercainal) 1 % rectal ointment, by PeriANAL route every four (4) hours as needed for Pain., Disp: 28 g, Rfl: 0    fenofibrate (LOFIBRA) 160 mg tablet, Take 1 Tablet by mouth daily. , Disp: 90 Tablet, Rfl: 3    lisinopril-hydroCHLOROthiazide (PRINZIDE, ZESTORETIC) 20-25 mg per tablet, TAKE 1 TABLET BY MOUTH EVERY DAY, Disp: 90 Tablet, Rfl: 3    verapamil ER (CALAN-SR) 240 mg CR tablet, TAKE 2 TABLETS BY MOUTH DAILY  Indications: high blood pressure, Disp: 180 Tablet, Rfl: 3    ferrous sulfate (Slow Fe) 142 mg (45 mg iron) ER tablet, Take 1 Tablet by mouth Daily (before breakfast). , Disp: 90 Tablet, Rfl: 1    aluminum-magnesium hydroxide (MAALOX) 200-200 mg/5 mL suspension, Take 15 mL by mouth every six (6) hours as needed for Indigestion. , Disp: 30 mL, Rfl: 3    Past Medical History:   Diagnosis Date    CKD (chronic kidney disease) stage 3, GFR 30-59 ml/min ([facility])     Hypertension     Hypopotassemia 2009    Other and unspecified hyperlipidemia 2008       History reviewed. No pertinent surgical history. Social History     Tobacco Use   Smoking Status Never Smoker   Smokeless Tobacco Never Used       Social History     Socioeconomic History    Marital status:    Tobacco Use    Smoking status: Never Smoker    Smokeless tobacco: Never Used   Vaping Use    Vaping Use: Never used   Substance and Sexual Activity    Alcohol use: No       Family History   Problem Relation Age of Onset    Hypertension Mother          Objective:     Visit Vitals  /68 (BP 1 Location: Right arm, BP Patient Position: Sitting, BP Cuff Size: Adult)   Pulse 88   Temp 97.8 °F (36.6 °C) (Temporal)   Resp 16   Ht 5' 4\" (1.626 m)   Wt 178 lb 6.4 oz (80.9 kg)   SpO2 99%   BMI 30.62 kg/m²     Body mass index is 30.62 kg/m². General: Alert and oriented. No acute distress. Well nourished  HEENT :  Ears:TMs are normal. Canals are clear. Eyes: pupils equal, round, react to light and accommodation. Extra ocular movements intact. Nose: patent. Mouth and throat is clear. Neck:supple full range of motion no thyromegaly. Trachea midline, No carotid bruits. No significant lymphadenopathy  Lungs[de-identified] clear to auscultation without wheezes, rales, or rhonchi. Heart :RRR, S1 & S2 are normal intensity. No murmur; no gallop  Abdomen: bowel sounds active. No tenderness, guarding, rebound, masses, hepatic or spleen enlargement  Back: no CVA tenderness.   Extremities: without clubbing, cyanosis, or edema  Pulses: radial and femoral pulses are normal  Neuro: HMF intact. Cranial nerves II through XII grossly normal.  Motor: is 5 over 5 and symmetrical.   Deep tendon reflexes: +2 equal  Psych:appropriate behavior, mood, affect and judgement. Results for orders placed or performed during the hospital encounter of 01/11/22   OCCULT BLOOD, STOOL   Result Value Ref Range    Occult blood, stool Negative NEG         No results found for this visit on 01/24/22. Assessment/ Plan:     1. Stage 3a chronic kidney disease (HCC)  Stable no change to medical management     2. Hemorrhoids, unspecified hemorrhoid type  Patient education provided with teachback  3. Constipation, unspecified constipation type  Patient eucation with teach back provided regarding diet , exercise       No orders of the defined types were placed in this encounter. Verbal and written instructions (see AVS) provided. Patient expresses understanding of diagnosis and treatment plan.     Health Maintenance Due   Topic Date Due    COVID-19 Vaccine (3 - Booster for Moderna series) 07/24/2021               Davey Pena, FNP-C

## 2022-03-03 DIAGNOSIS — E78.00 HYPERCHOLESTEROLEMIA: ICD-10-CM

## 2022-03-03 RX ORDER — FENOFIBRATE 160 MG/1
160 TABLET ORAL DAILY
Qty: 90 TABLET | Refills: 3 | Status: SHIPPED | OUTPATIENT
Start: 2022-03-03 | End: 2022-11-02

## 2022-07-06 ENCOUNTER — OFFICE VISIT (OUTPATIENT)
Dept: FAMILY MEDICINE CLINIC | Age: 67
End: 2022-07-06
Payer: MEDICARE

## 2022-07-06 DIAGNOSIS — E78.00 HYPERCHOLESTEROLEMIA: ICD-10-CM

## 2022-07-06 DIAGNOSIS — E78.2 MIXED HYPERLIPIDEMIA: ICD-10-CM

## 2022-07-06 DIAGNOSIS — I10 ESSENTIAL HYPERTENSION, BENIGN: ICD-10-CM

## 2022-07-06 DIAGNOSIS — R73.09 ELEVATED GLUCOSE: ICD-10-CM

## 2022-07-06 DIAGNOSIS — Z00.00 INITIAL MEDICARE ANNUAL WELLNESS VISIT: Primary | ICD-10-CM

## 2022-07-06 DIAGNOSIS — N18.31 STAGE 3A CHRONIC KIDNEY DISEASE (HCC): ICD-10-CM

## 2022-07-06 DIAGNOSIS — R35.1 NOCTURIA: ICD-10-CM

## 2022-07-06 PROCEDURE — G0438 PPPS, INITIAL VISIT: HCPCS | Performed by: NURSE PRACTITIONER

## 2022-07-06 NOTE — PROGRESS NOTES
1. \"Have you been to the ER, urgent care clinic since your last visit? Hospitalized since your last visit? \" no    2. \"Have you seen or consulted any other health care providers outside of the [address] [place] [address] since your last visit? \"  no    3. For patients aged 39-70: Has the patient had a colonoscopy / FIT/ Cologuard? Yes - no Care Gap present      If the patient is female:    4. For patients aged 41-77: Has the patient had a mammogram within the past 2 years? NA - based on age or sex      11. For patients aged 21-65: Has the patient had a pap smear? NA - based on age or sex    This is an Initial Medicare Annual Wellness Exam (AWV) (Performed 12 months after IPPE or effective date of Medicare Part B enrollment, Once in a lifetime)    I have reviewed the patient's medical history in detail and updated the computerized patient record. Assessment/Plan   Education and counseling provided:  Are appropriate based on today's review and evaluation    1. Initial Medicare annual wellness visit  -     CBC WITH AUTOMATED DIFF; Future  -     LIPID PANEL; Future  -     METABOLIC PANEL, COMPREHENSIVE; Future  -     COLLECTION VENOUS BLOOD,VENIPUNCTURE; Future  2. Hypercholesterolemia  -     CBC WITH AUTOMATED DIFF; Future  -     LIPID PANEL; Future  -     METABOLIC PANEL, COMPREHENSIVE; Future  -     COLLECTION VENOUS BLOOD,VENIPUNCTURE; Future  3. Stage 3a chronic kidney disease (HCC)  -     CBC WITH AUTOMATED DIFF; Future  -     LIPID PANEL; Future  -     METABOLIC PANEL, COMPREHENSIVE; Future  -     COLLECTION VENOUS BLOOD,VENIPUNCTURE; Future  -     REFERRAL TO NEPHROLOGY  4. Essential hypertension, benign  -     CBC WITH AUTOMATED DIFF; Future  -     LIPID PANEL; Future  -     METABOLIC PANEL, COMPREHENSIVE; Future  -     COLLECTION VENOUS BLOOD,VENIPUNCTURE; Future  5. Mixed hyperlipidemia  -     CBC WITH AUTOMATED DIFF; Future  -     LIPID PANEL; Future  -     METABOLIC PANEL, COMPREHENSIVE;  Future  - COLLECTION VENOUS BLOOD,VENIPUNCTURE; Future  6. Elevated glucose  -     CBC WITH AUTOMATED DIFF; Future  -     LIPID PANEL; Future  -     METABOLIC PANEL, COMPREHENSIVE; Future  -     COLLECTION VENOUS BLOOD,VENIPUNCTURE; Future  -     HEMOGLOBIN A1C WITH EAG; Future  7. Nocturia  -     PSA, DIAGNOSTIC (PROSTATE SPECIFIC AG); Future       Depression Risk Factor Screening     3 most recent PHQ Screens 7/6/2022   Little interest or pleasure in doing things Not at all   Feeling down, depressed, irritable, or hopeless Not at all   Total Score PHQ 2 0   Trouble falling or staying asleep, or sleeping too much Not at all   Feeling tired or having little energy Not at all   Poor appetite, weight loss, or overeating Not at all   Feeling bad about yourself - or that you are a failure or have let yourself or your family down Not at all   Trouble concentrating on things such as school, work, reading, or watching TV Not at all   Moving or speaking so slowly that other people could have noticed; or the opposite being so fidgety that others notice Not at all   Thoughts of being better off dead, or hurting yourself in some way Not at all   PHQ 9 Score 0   How difficult have these problems made it for you to do your work, take care of your home and get along with others Not difficult at all       Alcohol & Drug Abuse Risk Screen    Do you average more than 1 drink per night or more than 7 drinks a week: No    In the past three months have you have had more than 4 drinks containing alcohol on one occasion: No          Functional Ability and Level of Safety    Hearing: Hearing is good. Activities of Daily Living: The home contains: handrails  Patient does total self care     Ambulation: with no difficulty      Fall Risk:  Fall Risk Assessment, last 12 mths 7/6/2022   Able to walk? Yes   Fall in past 12 months? 0   Do you feel unsteady?  0   Are you worried about falling 0      Abuse Screen:  Patient is not abused       Cognitive Screening    Has your family/caregiver stated any concerns about your memory: no     Cognitive Screening: Normal - MMSE (Mini Mental Status Exam)    Health Maintenance Due     Health Maintenance Due   Topic Date Due    COVID-19 Vaccine (3 - Booster for Jay Richards series) 07/24/2021       Patient Care Team   Patient Care Team:  Marce Alvarez NP as PCP - General (Nurse Practitioner)  Marce Alvarez NP as PCP - [facility] Empaneled Provider    History     Patient Active Problem List   Diagnosis Code    Hyperlipemia E78.5    Essential hypertension, benign I10    CKD (chronic kidney disease) stage 3, GFR 30-59 ml/min ([address] N18.30     Past Medical History:   Diagnosis Date    CKD (chronic kidney disease) stage 3, GFR 30-59 ml/min ([address]     Hypertension     Hypopotassemia 2009    Other and unspecified hyperlipidemia 2008      No past surgical history on file. Current Outpatient Medications   Medication Sig Dispense Refill    fenofibrate (LOFIBRA) 160 mg tablet Take 1 Tablet by mouth daily. 90 Tablet 3    PREPARATION H HYDROCORTISONE EX by Apply Externally route as needed.  hydrocortisone-pramoxine (Proctofoam HC) rectal foam Insert 1 Applicator into rectum two (2) times a day. 1 Applicator 0    dibucaine (Nupercainal) 1 % rectal ointment by PeriANAL route every four (4) hours as needed for Pain. 28 g 0    lisinopril-hydroCHLOROthiazide (PRINZIDE, ZESTORETIC) 20-25 mg per tablet TAKE 1 TABLET BY MOUTH EVERY DAY 90 Tablet 3    verapamil ER (CALAN-SR) 240 mg CR tablet TAKE 2 TABLETS BY MOUTH DAILY  Indications: high blood pressure 180 Tablet 3    ferrous sulfate (Slow Fe) 142 mg (45 mg iron) ER tablet Take 1 Tablet by mouth Daily (before breakfast). 90 Tablet 1    aluminum-magnesium hydroxide (MAALOX) 200-200 mg/5 mL suspension Take 15 mL by mouth every six (6) hours as needed for Indigestion.  30 mL 3     No Known Allergies    Family History   Problem Relation Age of Onset    Hypertension Mother      Social History     Tobacco Use    Smoking status: Never Smoker    Smokeless tobacco: Never Used   Substance Use Topics    Alcohol use: No       Karyn Hardy RN

## 2022-07-06 NOTE — PATIENT INSTRUCTIONS
Medicare Wellness Visit, Male    The best way to live healthy is to have a lifestyle where you eat a well-balanced diet, exercise regularly, limit alcohol use, and quit all forms of tobacco/nicotine, if applicable. Regular preventive services are another way to keep healthy. Preventive services (vaccines, screening tests, monitoring & exams) can help personalize your care plan, which helps you manage your own care. Screening tests can find health problems at the earliest stages, when they are easiest to treat. [facility] follows the current, evidence-based guidelines published by the [facility] ([facility]) when recommending preventive services for our patients. Because we follow these guidelines, sometimes recommendations change over time as research supports it. (For example, a prostate screening blood test is no longer routinely recommended for men with no symptoms). Of course, you and your doctor may decide to screen more often for some diseases, based on your risk and co-morbidities (chronic disease you are already diagnosed with). Preventive services for you include:  - Medicare offers their members a free annual wellness visit, which is time for you and your primary care provider to discuss and plan for your preventive service needs. Take advantage of this benefit every year!  -All adults over age 72 should receive the recommended pneumonia vaccines. Current USPSTF guidelines recommend a series of two vaccines for the best pneumonia protection.   -All adults should have a flu vaccine yearly and tetanus vaccine every 10 years.  -All adults age 48 and older should receive the shingles vaccines (series of two vaccines).        -All adults age 38-68 who are overweight should have a diabetes screening test once every three years.   -Other screening tests & preventive services for persons with diabetes include: an eye exam to screen for diabetic retinopathy, a kidney function test, a foot exam, and stricter control over your cholesterol.   -Cardiovascular screening for adults with routine risk involves an electrocardiogram (ECG) at intervals determined by the provider.   -Colorectal cancer screening should be done for adults age 54-65 with no increased risk factors for colorectal cancer. There are a number of acceptable methods of screening for this type of cancer. Each test has its own benefits and drawbacks. Discuss with your provider what is most appropriate for you during your annual wellness visit. The different tests include: colonoscopy (considered the best screening method), a fecal occult blood test, a fecal DNA test, and sigmoidoscopy.  -All adults born between [facility] should be screened once for Hepatitis C.  -An Abdominal Aortic Aneurysm (AAA) Screening is recommended for men age 73-68 who has ever smoked in their lifetime.      Here is a list of your current Health Maintenance items (your personalized list of preventive services) with a due date:  Health Maintenance Due   Topic Date Due    COVID-19 Vaccine (3 - Booster for Toya Rim series) 07/24/2021

## 2022-07-07 VITALS
HEIGHT: 64 IN | WEIGHT: 177 LBS | RESPIRATION RATE: 16 BRPM | DIASTOLIC BLOOD PRESSURE: 76 MMHG | TEMPERATURE: 96.8 F | BODY MASS INDEX: 30.22 KG/M2 | SYSTOLIC BLOOD PRESSURE: 134 MMHG | HEART RATE: 86 BPM | OXYGEN SATURATION: 98 %

## 2022-07-07 LAB
ALBUMIN SERPL-MCNC: 4.2 G/DL (ref 3.5–5)
ALBUMIN/GLOB SERPL: 1.3 {RATIO} (ref 1.1–2.2)
ALP SERPL-CCNC: 33 U/L (ref 45–117)
ALT SERPL-CCNC: 20 U/L (ref 12–78)
ANION GAP SERPL CALC-SCNC: 8 MMOL/L (ref 5–15)
AST SERPL-CCNC: 16 U/L (ref 15–37)
BASOPHILS # BLD: 0.1 K/UL (ref 0–0.1)
BASOPHILS NFR BLD: 1 % (ref 0–1)
BILIRUB SERPL-MCNC: 0.4 MG/DL (ref 0.2–1)
BUN SERPL-MCNC: 23 MG/DL (ref 6–20)
BUN/CREAT SERPL: 16 (ref 12–20)
CALCIUM SERPL-MCNC: 9.9 MG/DL (ref 8.5–10.1)
CHLORIDE SERPL-SCNC: 107 MMOL/L (ref 97–108)
CHOLEST SERPL-MCNC: 164 MG/DL
CO2 SERPL-SCNC: 24 MMOL/L (ref 21–32)
CREAT SERPL-MCNC: 1.45 MG/DL (ref 0.7–1.3)
DIFFERENTIAL METHOD BLD: ABNORMAL
EOSINOPHIL # BLD: 0.1 K/UL (ref 0–0.4)
EOSINOPHIL NFR BLD: 2 % (ref 0–7)
ERYTHROCYTE [DISTWIDTH] IN BLOOD BY AUTOMATED COUNT: 13.4 % (ref 11.5–14.5)
EST. AVERAGE GLUCOSE BLD GHB EST-MCNC: 120 MG/DL
GLOBULIN SER CALC-MCNC: 3.2 G/DL (ref 2–4)
GLUCOSE SERPL-MCNC: 110 MG/DL (ref 65–100)
HBA1C MFR BLD: 5.8 % (ref 4–5.6)
HCT VFR BLD AUTO: 34.2 % (ref 36.6–50.3)
HDLC SERPL-MCNC: 42 MG/DL
HDLC SERPL: 3.9 {RATIO} (ref 0–5)
HGB BLD-MCNC: 11.4 G/DL (ref 12.1–17)
IMM GRANULOCYTES # BLD AUTO: 0 K/UL (ref 0–0.04)
IMM GRANULOCYTES NFR BLD AUTO: 0 % (ref 0–0.5)
LDLC SERPL CALC-MCNC: 87.4 MG/DL (ref 0–100)
LYMPHOCYTES # BLD: 1.5 K/UL (ref 0.8–3.5)
LYMPHOCYTES NFR BLD: 33 % (ref 12–49)
MCH RBC QN AUTO: 31.1 PG (ref 26–34)
MCHC RBC AUTO-ENTMCNC: 33.3 G/DL (ref 30–36.5)
MCV RBC AUTO: 93.4 FL (ref 80–99)
MONOCYTES # BLD: 0.4 K/UL (ref 0–1)
MONOCYTES NFR BLD: 9 % (ref 5–13)
NEUTS SEG # BLD: 2.5 K/UL (ref 1.8–8)
NEUTS SEG NFR BLD: 55 % (ref 32–75)
NRBC # BLD: 0 K/UL (ref 0–0.01)
NRBC BLD-RTO: 0 PER 100 WBC
PLATELET # BLD AUTO: 361 K/UL (ref 150–400)
PMV BLD AUTO: 10.3 FL (ref 8.9–12.9)
POTASSIUM SERPL-SCNC: 3.9 MMOL/L (ref 3.5–5.1)
PROT SERPL-MCNC: 7.4 G/DL (ref 6.4–8.2)
PSA SERPL-MCNC: 0.9 NG/ML (ref 0.01–4)
RBC # BLD AUTO: 3.66 M/UL (ref 4.1–5.7)
SODIUM SERPL-SCNC: 139 MMOL/L (ref 136–145)
TRIGL SERPL-MCNC: 173 MG/DL (ref ?–150)
VLDLC SERPL CALC-MCNC: 34.6 MG/DL
WBC # BLD AUTO: 4.6 K/UL (ref 4.1–11.1)

## 2022-07-07 RX ORDER — ATORVASTATIN CALCIUM 10 MG/1
10 TABLET, FILM COATED ORAL DAILY
Qty: 90 TABLET | Refills: 1 | Status: SHIPPED | OUTPATIENT
Start: 2022-07-07

## 2022-07-07 NOTE — PROGRESS NOTES
PSA level prostate is excellent   HGBA1C stable at prediabetes level. Metabolic panel mild decline in kidney function -consider establishing with a nephrologist - Cynthia David or Dr. Doreen Oneal in [place]  Cholesterol level triglycerides are starting to climb. Consider starting a statin which pharmacy?

## 2022-07-07 NOTE — PROGRESS NOTES
Patient verified stating name and date of birth. Patient informed of lab results and states understanding.  He will think about the nephrologist and  statin

## 2022-07-07 NOTE — ACP (ADVANCE CARE PLANNING)
Discussed importance of advanced medical directives with patient. Patient is capable of making decisions.   James Vargas NP-C

## 2022-07-26 ENCOUNTER — TELEPHONE (OUTPATIENT)
Dept: FAMILY MEDICINE CLINIC | Age: 67
End: 2022-07-26

## 2022-07-26 NOTE — TELEPHONE ENCOUNTER
Pt needs to ask some questions about some paper work he has from his kidney doctor. Would like a call back.

## 2022-11-02 DIAGNOSIS — E78.00 HYPERCHOLESTEROLEMIA: ICD-10-CM

## 2022-11-02 RX ORDER — FENOFIBRATE 160 MG/1
160 TABLET ORAL DAILY
Qty: 90 TABLET | Refills: 3 | Status: SHIPPED | OUTPATIENT
Start: 2022-11-02

## 2022-11-10 ENCOUNTER — TELEPHONE (OUTPATIENT)
Dept: FAMILY MEDICINE CLINIC | Age: 67
End: 2022-11-10

## 2022-11-10 NOTE — TELEPHONE ENCOUNTER
Pt is requesting a refill on verapamil 240 mg. He uses [facility]. He has enough until Monday when Trisha Gemabrady returns.

## 2022-11-14 RX ORDER — VERAPAMIL HYDROCHLORIDE 240 MG/1
TABLET, FILM COATED, EXTENDED RELEASE ORAL
Qty: 180 TABLET | Refills: 3 | Status: SHIPPED | OUTPATIENT
Start: 2022-11-14

## 2022-11-30 DIAGNOSIS — I10 ESSENTIAL HYPERTENSION, BENIGN: ICD-10-CM

## 2022-11-30 NOTE — TELEPHONE ENCOUNTER
Fenofibrate sent on 11/2, I informed pt he has refill at the pharmacy for that. Pt expressed understanding.  KT

## 2022-12-01 ENCOUNTER — CLINICAL SUPPORT (OUTPATIENT)
Dept: FAMILY MEDICINE CLINIC | Age: 67
End: 2022-12-01
Payer: MEDICARE

## 2022-12-01 DIAGNOSIS — Z23 NEEDS FLU SHOT: Primary | ICD-10-CM

## 2022-12-01 DIAGNOSIS — I10 ESSENTIAL HYPERTENSION, BENIGN: ICD-10-CM

## 2022-12-01 PROCEDURE — G0008 ADMIN INFLUENZA VIRUS VAC: HCPCS | Performed by: NURSE PRACTITIONER

## 2022-12-01 PROCEDURE — 90694 VACC AIIV4 NO PRSRV 0.5ML IM: CPT | Performed by: NURSE PRACTITIONER

## 2022-12-01 RX ORDER — LISINOPRIL AND HYDROCHLOROTHIAZIDE 20; 25 MG/1; MG/1
TABLET ORAL
Qty: 90 TABLET | Refills: 3 | Status: SHIPPED | OUTPATIENT
Start: 2022-12-01

## 2022-12-01 NOTE — PATIENT INSTRUCTIONS
Vaccine Information Statement    Influenza (Flu) Vaccine (Inactivated or Recombinant): What You Need to Know    Many vaccine information statements are available in [place] and other languages. See www.immunize.org/vis. Hojas de información sobre vacunas están disponibles en español y en muchos otros idiomas. Visite www.immunize.org/vis. 1. Why get vaccinated? Influenza vaccine can prevent influenza (flu). Flu is a contagious disease that spreads around the [facility] every year, usually between October and May. Anyone can get the flu, but it is more dangerous for some people. Infants and young children, people 72 years and older, pregnant people, and people with certain health conditions or a weakened immune system are at greatest risk of flu complications. Pneumonia, bronchitis, sinus infections, and ear infections are examples of flu-related complications. If you have a medical condition, such as heart disease, cancer, or diabetes, flu can make it worse. Flu can cause fever and chills, sore throat, muscle aches, fatigue, cough, headache, and runny or stuffy nose. Some people may have vomiting and diarrhea, though this is more common in children than adults. In an average year, thousands of people in the [facility] die from flu, and many more are hospitalized. Flu vaccine prevents millions of illnesses and flu-related visits to the doctor each year. 2. Influenza vaccines     CDC recommends everyone 6 months and older get vaccinated every flu season. Children 6 months through 6years of age may need 2 doses during a single flu season. Everyone else needs only 1 dose each flu season. It takes about 2 weeks for protection to develop after vaccination. There are many flu viruses, and they are always changing. Each year a new flu vaccine is made to protect against the influenza viruses believed to be likely to cause disease in the upcoming flu season.  Even when the vaccine doesnt exactly match these viruses, it may still provide some protection. Influenza vaccine does not cause flu. Influenza vaccine may be given at the same time as other vaccines. 3. Talk with your health care provider    Tell your vaccination provider if the person getting the vaccine:  Has had an allergic reaction after a previous dose of influenza vaccine, or has any severe, life-threatening allergies   Has ever had Guillain-Barré Syndrome (also called GBS)    In some cases, your health care provider may decide to postpone influenza vaccination until a future visit. Influenza vaccine can be administered at any time during pregnancy. People who are or will be pregnant during influenza season should receive inactivated influenza vaccine. People with minor illnesses, such as a cold, may be vaccinated. People who are moderately or severely ill should usually wait until they recover before getting influenza vaccine. Your health care provider can give you more information. 4. Risks of a vaccine reaction    Soreness, redness, and swelling where the shot is given, fever, muscle aches, and headache can happen after influenza vaccination. There may be a very small increased risk of Guillain-Barré Syndrome (GBS) after inactivated influenza vaccine (the flu shot). Gasper Rad[place] children who get the flu shot along with pneumococcal vaccine (PCV13) and/or DTaP vaccine at the same time might be slightly more likely to have a seizure caused by fever. Tell your health care provider if a child who is getting flu vaccine has ever had a seizure. People sometimes faint after medical procedures, including vaccination. Tell your provider if you feel dizzy or have vision changes or ringing in the ears. As with any medicine, there is a very remote chance of a vaccine causing a severe allergic reaction, other serious injury, or death. 5. What if there is a serious problem?     An allergic reaction could occur after the vaccinated person leaves the clinic. If you see signs of a severe allergic reaction (hives, swelling of the face and throat, difficulty breathing, a fast heartbeat, dizziness, or weakness), call 9-1-1 and get the person to the nearest hospital.    For other signs that concern you, call your health care provider. Adverse reactions should be reported to the Vaccine Adverse Event Reporting System (VAERS). Your health care provider will usually file this report, or you can do it yourself. Visit the VAERS website at www.vaers. [facility].gov or call 4-457.745.2654. VAERS is only for reporting reactions, and VAERS staff members do not give medical advice. 6. The National Vaccine Injury Compensation Program    The [facility] Vaccine Injury Compensation Program (VICP) is a federal program that was created to compensate people who may have been injured by certain vaccines. Claims regarding alleged injury or death due to vaccination have a time limit for filing, which may be as short as two years. Visit the VICP website at www.[facility].gov/vaccinecompensation or call 0-589.139.1670 to learn about the program and about filing a claim. 7. How can I learn more? Ask your health care provider. Call your local or state health department. Visit the website of the Food and Drug Administration (FDA) for vaccine package inserts and additional information at www.fda.gov/vaccines-blood-biologics/vaccines. Contact the Centers for Disease Control and Prevention (CDC): Call 6-292.655.4172 (9-979-LIP-INFO) or  Visit CDCs influenza website at www.cdc.gov/flu. Vaccine Information Statement   Inactivated Influenza Vaccine   8/6/2021  [address] 504MN-45   Department of Health and Human Services  Centers for Disease Control and Prevention    Office Use Only

## 2022-12-01 NOTE — PROGRESS NOTES
Chief Complaint   Patient presents with    Immunization/Injection     Flu       Patient was administered Flu shot in Lt deltoid via IM. Patient tolerated Flu shot well. Medication information reviewed with patient, patient states understanding. Patient to resume routine medications at home. Patient given copy of AVS and VIIS with medication information and instructions for home. VIIS reviewed with patient and patient states understanding. Pt stated he had raked leaves 2 days ago, felt warm, starting sweating, so took off his jacket. Last night he started with a hoarse voice, throat little scratchy, felt warm as if he had a fever, took Tylenol. Today still hoarse, throat feels little congested with a cough, so would like to check with the doctor. SW NP Neil Maya, per her, no fever today, so is OK to administer the Flu vaccine. Patient did ask if he would be OK with the cough, I did instruct him to keep an eye on himself, if symptoms worsens, he was given the #'s to our sister offices for any availability, Neil Maya is only half day today. He stated OK of understanding, thanks , but only wanted Jenni and Rosa.

## 2022-12-06 ENCOUNTER — VIRTUAL VISIT (OUTPATIENT)
Dept: FAMILY MEDICINE CLINIC | Age: 67
End: 2022-12-06
Payer: MEDICARE

## 2022-12-06 DIAGNOSIS — J01.00 ACUTE NON-RECURRENT MAXILLARY SINUSITIS: Primary | ICD-10-CM

## 2022-12-06 PROCEDURE — 1101F PT FALLS ASSESS-DOCD LE1/YR: CPT | Performed by: NURSE PRACTITIONER

## 2022-12-06 PROCEDURE — G2025 DIS SITE TELE SVCS RHC/FQHC: HCPCS | Performed by: NURSE PRACTITIONER

## 2022-12-06 PROCEDURE — G8756 NO BP MEASURE DOC: HCPCS | Performed by: NURSE PRACTITIONER

## 2022-12-06 RX ORDER — METHYLPREDNISOLONE 4 MG/1
TABLET ORAL
Qty: 1 DOSE PACK | Refills: 0 | Status: SHIPPED | OUTPATIENT
Start: 2022-12-06

## 2022-12-06 RX ORDER — AZITHROMYCIN 250 MG/1
TABLET, FILM COATED ORAL
Qty: 6 TABLET | Refills: 0 | Status: SHIPPED | OUTPATIENT
Start: 2022-12-06

## 2022-12-06 NOTE — PROGRESS NOTES
Archana Iyer is a 78 y.o. male, evaluated via audio-only technology on 12/6/2022 for Cough (For a few days)  . head and chest cold x 3 days. He has not tried anything except warm liquids. He denies fever , body aches and chills. He also denies any ill contacts. Mr. Augie Fleming endorses facial tenderness with puffiness around the eyes. More prominent with changes in the weather//season    Assessment & Plan:   Diagnoses and all orders for this visit:    1. Acute non-recurrent maxillary sinusitis    Other orders  -     azithromycin (ZITHROMAX) 250 mg tablet; Take 2 tablets today, then take 1 tablet daily  -     methylPREDNISolone (MEDROL DOSEPACK) 4 mg tablet; Per dose pack instructions      The complexity of medical decision making for this visit is moderate       12  Subjective:       Prior to Admission medications    Medication Sig Start Date End Date Taking? Authorizing Provider   lisinopril-hydroCHLOROthiazide (PRINZIDE, ZESTORETIC) 20-25 mg per tablet TAKE 1 TABLET BY MOUTH EVERY DAY 12/1/22  Yes Xavier Peres NP   verapamil ER (CALAN-SR) 240 mg CR tablet TAKE 2 TABLETS BY MOUTH DAILY  Indications: high blood pressure 11/14/22  Yes Xavier Peres NP   fenofibrate (LOFIBRA) 160 mg tablet TAKE 1 TABLET BY MOUTH DAILY 11/2/22  Yes Xavier Peres NP   atorvastatin (LIPITOR) 10 mg tablet Take 1 Tablet by mouth daily. 7/7/22  Yes Xavier Peres NP   PREPARATION H HYDROCORTISONE EX by Apply Externally route as needed. PRN   Yes Other, MD [place]   hydrocortisone-pramoxine (Proctofoam HC) rectal foam Insert 1 Applicator into rectum two (2) times a day. Patient taking differently: Insert 1 Applicator into rectum two (2) times a day. PRN 1/11/22  Yes King Wicho MD   ferrous sulfate (Slow Fe) 142 mg (45 mg iron) ER tablet Take 1 Tablet by mouth Daily (before breakfast).  8/3/21  Yes Xavier Peres NP   aluminum-magnesium hydroxide (MAALOX) 200-200 mg/5 mL suspension Take 15 mL by mouth every six (6) hours as needed for Indigestion. 8/3/21  Yes Randal Lopez NP   lisinopril-hydroCHLOROthiazide (PRINZIDE, ZESTORETIC) 20-25 mg per tablet TAKE 1 TABLET BY MOUTH EVERY DAY 12/1/22 12/6/22  Randal Lopez NP   dibucaine (Nupercainal) 1 % rectal ointment by PeriANAL route every four (4) hours as needed for Pain. Patient not taking: No sig reported 1/11/22   Argenis Watkins MD     Patient Active Problem List   Diagnosis Code    Hyperlipemia E78.5    Essential hypertension, benign I10    CKD (chronic kidney disease) stage 3, GFR 30-59 ml/min ([facility]) N18.30     Patient Active Problem List    Diagnosis Date Noted    CKD (chronic kidney disease) stage 3, GFR 30-59 ml/min ([facility])     Hyperlipemia 08/26/2013    Essential hypertension, benign 08/26/2013     Current Outpatient Medications   Medication Sig Dispense Refill    azithromycin (ZITHROMAX) 250 mg tablet Take 2 tablets today, then take 1 tablet daily 6 Tablet 0    methylPREDNISolone (MEDROL DOSEPACK) 4 mg tablet Per dose pack instructions 1 Dose Pack 0    lisinopril-hydroCHLOROthiazide (PRINZIDE, ZESTORETIC) 20-25 mg per tablet TAKE 1 TABLET BY MOUTH EVERY DAY 90 Tablet 3    verapamil ER (CALAN-SR) 240 mg CR tablet TAKE 2 TABLETS BY MOUTH DAILY  Indications: high blood pressure 180 Tablet 3    fenofibrate (LOFIBRA) 160 mg tablet TAKE 1 TABLET BY MOUTH DAILY 90 Tablet 3    atorvastatin (LIPITOR) 10 mg tablet Take 1 Tablet by mouth daily. 90 Tablet 1    PREPARATION H HYDROCORTISONE EX by Apply Externally route as needed. PRN      hydrocortisone-pramoxine (Proctofoam HC) rectal foam Insert 1 Applicator into rectum two (2) times a day. (Patient taking differently: Insert 1 Applicator into rectum two (2) times a day. PRN) 1 Applicator 0    ferrous sulfate (Slow Fe) 142 mg (45 mg iron) ER tablet Take 1 Tablet by mouth Daily (before breakfast).  90 Tablet 1    aluminum-magnesium hydroxide (MAALOX) 200-200 mg/5 mL suspension Take 15 mL by mouth every six (6) hours as needed for Indigestion. 30 mL 3    dibucaine (Nupercainal) 1 % rectal ointment by PeriANAL route every four (4) hours as needed for Pain. (Patient not taking: No sig reported) 28 g 0     No Known Allergies  Past Medical History:   Diagnosis Date    CKD (chronic kidney disease) stage 3, GFR 30-59 ml/min ([facility] [address]     Hypertension     Hypopotassemia 2009    Other and unspecified hyperlipidemia 2008     History reviewed. No pertinent surgical history. Family History   Problem Relation Age of Onset    Hypertension Mother      Social History     Tobacco Use    Smoking status: Never    Smokeless tobacco: Never   Substance Use Topics    Alcohol use: No       ROS  Pertinent items are noted in the HPI  Patient-Reported Temperature: reports no fever       Tricia Cheung was evaluated through a patient-initiated, synchronous (real-time) audio only encounter. He (or guardian if applicable) is aware that it is a billable service, which includes applicable co-pays, with coverage as determined by his insurance carrier. This visit was conducted with the patient's (and/or Felicita Dubin guardian's) verbal consent. He has not had a related appointment within my department in the past 7 days or scheduled within the next 24 hours. The patient was located in a state where the provider was licensed to provide care. The patient was located at: Home: [address] [place] [id number]  The provider was located at:  Facility (Appt Department): [address]  [id number]    Total Time: minutes: 11-20 minutes    Jake Pagan NP

## 2022-12-06 NOTE — ACP (ADVANCE CARE PLANNING)
Discussed importance of advanced medical directives with patient. Patient is capable of making decisions. Kate Miners NP-C

## 2022-12-06 NOTE — PROGRESS NOTES
Chief Complaint   Patient presents with    Cough     For a few days     1. \"Have you been to the ER, urgent care clinic since your last visit? Hospitalized since your last visit? \" No    2. \"Have you seen or consulted any other health care providers outside of the [address] [place] [address] since your last visit? \" No     3. For patients aged 39-70: Has the patient had a colonoscopy / FIT/ Cologuard? Yes - no Care Gap present      If the patient is female:    4. For patients aged 41-77: Has the patient had a mammogram within the past 2 years? NA - based on age or sex      11. For patients aged 21-65: Has the patient had a pap smear?  NA - based on age or sex

## 2023-01-04 ENCOUNTER — TELEPHONE (OUTPATIENT)
Dept: FAMILY MEDICINE CLINIC | Age: 68
End: 2023-01-04

## 2023-01-04 NOTE — TELEPHONE ENCOUNTER
PC SW  Juan Francisco Pugh, he stated he wanted Juliann Dodge to know daily off and on he is having a bad taste and smell in his mouth for last 3 days. It is not necessarily when he eats, but can happened at anytime.   I offered him to make an appointment, but wanted Juliann Dodge to know first.

## 2023-01-04 NOTE — TELEPHONE ENCOUNTER
Laurenceorrignacio Cardoso is returning a call from Amita So. He said you can give him a call back at any time.

## 2023-01-09 ENCOUNTER — OFFICE VISIT (OUTPATIENT)
Dept: FAMILY MEDICINE CLINIC | Age: 68
End: 2023-01-09
Payer: MEDICARE

## 2023-01-09 VITALS
DIASTOLIC BLOOD PRESSURE: 76 MMHG | HEART RATE: 74 BPM | HEIGHT: 64 IN | OXYGEN SATURATION: 99 % | RESPIRATION RATE: 18 BRPM | SYSTOLIC BLOOD PRESSURE: 138 MMHG | TEMPERATURE: 98.3 F | BODY MASS INDEX: 30.11 KG/M2 | WEIGHT: 176.4 LBS

## 2023-01-09 DIAGNOSIS — E78.00 HYPERCHOLESTEROLEMIA: ICD-10-CM

## 2023-01-09 DIAGNOSIS — I10 ESSENTIAL HYPERTENSION, BENIGN: Primary | ICD-10-CM

## 2023-01-09 DIAGNOSIS — E78.2 MIXED HYPERLIPIDEMIA: ICD-10-CM

## 2023-01-09 DIAGNOSIS — R73.09 ELEVATED GLUCOSE: ICD-10-CM

## 2023-01-09 DIAGNOSIS — N18.31 STAGE 3A CHRONIC KIDNEY DISEASE (HCC): ICD-10-CM

## 2023-01-09 PROCEDURE — G8427 DOCREV CUR MEDS BY ELIG CLIN: HCPCS | Performed by: NURSE PRACTITIONER

## 2023-01-09 PROCEDURE — 1101F PT FALLS ASSESS-DOCD LE1/YR: CPT | Performed by: NURSE PRACTITIONER

## 2023-01-09 PROCEDURE — G8536 NO DOC ELDER MAL SCRN: HCPCS | Performed by: NURSE PRACTITIONER

## 2023-01-09 PROCEDURE — 36415 COLL VENOUS BLD VENIPUNCTURE: CPT | Performed by: NURSE PRACTITIONER

## 2023-01-09 PROCEDURE — 3075F SYST BP GE 130 - 139MM HG: CPT | Performed by: NURSE PRACTITIONER

## 2023-01-09 PROCEDURE — 1123F ACP DISCUSS/DSCN MKR DOCD: CPT | Performed by: NURSE PRACTITIONER

## 2023-01-09 PROCEDURE — G8432 DEP SCR NOT DOC, RNG: HCPCS | Performed by: NURSE PRACTITIONER

## 2023-01-09 PROCEDURE — 99214 OFFICE O/P EST MOD 30 MIN: CPT | Performed by: NURSE PRACTITIONER

## 2023-01-09 PROCEDURE — 3017F COLORECTAL CA SCREEN DOC REV: CPT | Performed by: NURSE PRACTITIONER

## 2023-01-09 PROCEDURE — 3078F DIAST BP <80 MM HG: CPT | Performed by: NURSE PRACTITIONER

## 2023-01-09 PROCEDURE — G8417 CALC BMI ABV UP PARAM F/U: HCPCS | Performed by: NURSE PRACTITIONER

## 2023-01-09 RX ORDER — BRAN/GUM/FIB/CEL/PSYL/KELP/PEC 1000 MG
1000 TABLET ORAL DAILY
COMMUNITY

## 2023-01-09 NOTE — PROGRESS NOTES
Identified pt with two pt identifiers(name and ). Reviewed record in preparation for visit and have obtained necessary documentation. Chief Complaint   Patient presents with    Hypertension      Vitals:    23 0835   BP: (!) 156/78   Pulse: 74   Resp: 18   Temp: 98.3 °F (36.8 °C)   TempSrc: Oral   SpO2: 99%   Weight: 176 lb 6.4 oz (80 kg)   Height: 5' 4\" (1.626 m)   PainSc:   0 - No pain       Coordination of Care Questionnaire:  :       1. \"Have you been to the ER, urgent care clinic since your last visit? Hospitalized since your last visit? \" No    2. \"Have you seen or consulted any other health care providers outside of the [address] [place] [address] since your last visit? \" No     3. For patients aged 39-70: Has the patient had a colonoscopy / FIT/ Cologuard? Yes - no Care Gap present      If the patient is female:    4. For patients aged 41-77: Has the patient had a mammogram within the past 2 years? NA - based on age or sex      11. For patients aged 21-65: Has the patient had a pap smear?  NA - based on age or sex

## 2023-01-09 NOTE — ACP (ADVANCE CARE PLANNING)
Discussed importance of advanced medical directives with patient. Patient is capable of making decisions. Katerine Torres NP-C

## 2023-01-09 NOTE — PROGRESS NOTES
Subjective:     Tanner Holguin is a 78 y.o. male who presents today with the following:  Chief Complaint   Patient presents with    Hypertension       Patient Active Problem List   Diagnosis Code    Hyperlipemia E78.5    Essential hypertension, benign I10    CKD (chronic kidney disease) stage 3, GFR 30-59 ml/min ([facility]) N18.30   Mr. Francisco Talamantes endorses he stays busier in the fall spring and summer. Winter months he is challenged trying to stay busy. 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 Mr. Verenice Villegas expressed that helps him feel better and gave him some ideas to do in his garage. He also will have broadband Internet soon which will open up more options to keep him busy. COMPLIANT WITH MEDICATION:   HTN; Denies chest pain, dyspnea, palpitations, headache and blurred vision. Blood pressure normotensive. Taking      lisinopril-hydrochlorothiazide (PRINZIDE, ZESTORETIC) 20-25 mg per tablet, TAKE 1 TABLET BY MOUTH EVERY DAY, Disp: 90 Tablet, Rfl: 3    verapamil ER (CALAN-SR) 240 mg CR tablet, TAKE 2 TABLETS BY MOUTH DAILY  Indications: high blood pressure, Disp: 180 Tablet, Rfl: 3    fenofibrate (LOFIBRA) 160 mg tablet, TAKE 1 TABLET BY MOUTH DAILY, Disp: 90 Tablet, Rfl: 3    atorvastatin (LIPITOR) 10 mg tablet, Take 1 Tablet by mouth daily. , Disp: 90 Tablet, Rfl: 1    Hyperlipidemia; Denies chest pain, dyspnea, palpitations, headache and blurred vision. Blood pressure normotensive.  Taking      lisinopril-hydroCHLOROthiazide (PRINZIDE, ZESTORETIC) 20-25 mg per tablet, TAKE 1 TABLET BY MOUTH EVERY DAY, Disp: 90 Tablet, Rfl: 3    verapamil ER (CALAN-SR) 240 mg CR tablet, TAKE 2 TABLETS BY MOUTH DAILY  Indications: high blood pressure, Disp: 180 Tablet, Rfl: 3    fenofibrate (LOFIBRA) 160 mg tablet, TAKE 1 TABLET BY MOUTH DAILY, Disp: 90 Tablet, Rfl: 3    atorvastatin (LIPITOR) 10 mg tablet, Take 1 Tablet by mouth daily. , Disp: 90 Tablet, Rfl: 1    depression screening addressed not at risk    abuse screening addressed denies    learning assessment addressed reviewed nurses notes    fall risk addressed not at risk    HM: addressed FIT test provided with instructions. ROS:  Gen: denies fever, chills, fatigue, weight loss, weight gain  HEENT:denies blurry vision, nasal congestion, sore throat  Resp: denies dypsnea, cough, wheezing  CV: denies chest pain radiating to the jaws or arms, palpitations, lower extremity edema  Abd: denies nausea, vomiting, diarrhea, constipation  Neuro: denies numbness/tingling  Endo: denies polyuria, polydipsia, heat/cold intolerance  Heme: no lymphadenopathy    No Known Allergies      Current Outpatient Medications:     lisinopril-hydroCHLOROthiazide (PRINZIDE, ZESTORETIC) 20-25 mg per tablet, TAKE 1 TABLET BY MOUTH EVERY DAY, Disp: 90 Tablet, Rfl: 3    verapamil ER (CALAN-SR) 240 mg CR tablet, TAKE 2 TABLETS BY MOUTH DAILY  Indications: high blood pressure, Disp: 180 Tablet, Rfl: 3    fenofibrate (LOFIBRA) 160 mg tablet, TAKE 1 TABLET BY MOUTH DAILY, Disp: 90 Tablet, Rfl: 3    atorvastatin (LIPITOR) 10 mg tablet, Take 1 Tablet by mouth daily. , Disp: 90 Tablet, Rfl: 1    ferrous sulfate (Slow Fe) 142 mg (45 mg iron) ER tablet, Take 1 Tablet by mouth Daily (before breakfast). , Disp: 90 Tablet, Rfl: 1    aluminum-magnesium hydroxide (MAALOX) 200-200 mg/5 mL suspension, Take 15 mL by mouth every six (6) hours as needed for Indigestion. , Disp: 30 mL, Rfl: 3    bran-gum-fib-nellie-psyl-kelp-pec (Fiber 6) 1,000 mg tab, Take 1,000 mg by mouth daily. , Disp: , Rfl:     azithromycin (ZITHROMAX) 250 mg tablet, Take 2 tablets today, then take 1 tablet daily (Patient not taking: Reported on 1/9/2023), Disp: 6 Tablet, Rfl: 0    methylPREDNISolone (MEDROL DOSEPACK) 4 mg tablet, Per dose pack instructions (Patient not taking: Reported on 1/9/2023), Disp: 1 Dose Pack, Rfl: 0    PREPARATION H HYDROCORTISONE EX, by Apply Externally route as needed. PRN (Patient not taking: Reported on 1/9/2023), Disp: , Rfl:     hydrocortisone-pramoxine (Proctofoam HC) rectal foam, Insert 1 Applicator into rectum two (2) times a day. (Patient not taking: Reported on 6/6/9141), Disp: 1 Applicator, Rfl: 0    dibucaine (Nupercainal) 1 % rectal ointment, by PeriANAL route every four (4) hours as needed for Pain. (Patient not taking: No sig reported), Disp: 28 g, Rfl: 0    Past Medical History:   Diagnosis Date    CKD (chronic kidney disease) stage 3, GFR 30-59 ml/min ([facility])     Hypertension     Hypopotassemia 2009    Other and unspecified hyperlipidemia 2008       History reviewed. No pertinent surgical history. Social History     Tobacco Use   Smoking Status Never   Smokeless Tobacco Never       Social History     Socioeconomic History    Marital status:    Tobacco Use    Smoking status: Never    Smokeless tobacco: Never   Vaping Use    Vaping Use: Never used   Substance and Sexual Activity    Alcohol use: No     Social Determinants of Health     Financial Resource Strain: Low Risk     Difficulty of Paying Living Expenses: Not hard at all   Food Insecurity: No Food Insecurity    Worried About Running Out of Food in the Last Year: Never true    Ran Out of Food in the Last Year: Never true   Transportation Needs: No Transportation Needs    Lack of Transportation (Medical): No    Lack of Transportation (Non-Medical): No       Family History   Problem Relation Age of Onset    Hypertension Mother          Objective:     Visit Vitals  /76 (BP 1 Location: Right arm, BP Patient Position: Sitting, BP Cuff Size: Large adult)   Pulse 74   Temp 98.3 °F (36.8 °C) (Oral)   Resp 18   Ht 5' 4\" (1.626 m)   Wt 176 lb 6.4 oz (80 kg)   SpO2 99%   BMI 30.28 kg/m²     Body mass index is 30.28 kg/m². General: Alert and oriented. No acute distress. Well nourished  HEENT :  Ears:TMs are normal. Canals are clear.    Eyes: pupils equal, round, react to light and accommodation. Extra ocular movements intact. Nose: patent. Mouth and throat is clear. Neck:supple full range of motion no thyromegaly. Trachea midline, No carotid bruits. No significant lymphadenopathy  Lungs[de-identified] clear to auscultation without wheezes, rales, or rhonchi. Heart :RRR, S1 & S2 are normal intensity. No murmur; no gallop  Abdomen: bowel sounds active. No tenderness, guarding, rebound, masses, hepatic or spleen enlargement  Back: no CVA tenderness. Extremities: without clubbing, cyanosis, or edema  Pulses: radial and femoral pulses are normal  Neuro: HMF intact. Cranial nerves II through XII grossly normal.  Motor: is 5 over 5 and symmetrical.   Deep tendon reflexes: +2 equal  Psych:appropriate behavior, mood, affect and judgement. Results for orders placed or performed in visit on 07/06/22   PSA, DIAGNOSTIC (PROSTATE SPECIFIC AG)   Result Value Ref Range    Prostate Specific Ag 0.9 0.01 - 4.0 ng/mL   HEMOGLOBIN A1C WITH EAG   Result Value Ref Range    Hemoglobin A1c 5.8 (H) 4.0 - 5.6 %    Est. average glucose 463 mg/dL   METABOLIC PANEL, COMPREHENSIVE   Result Value Ref Range    Sodium 139 136 - 145 mmol/L    Potassium 3.9 3.5 - 5.1 mmol/L    Chloride 107 97 - 108 mmol/L    CO2 24 21 - 32 mmol/L    Anion gap 8 5 - 15 mmol/L    Glucose 110 (H) 65 - 100 mg/dL    BUN 23 (H) 6 - 20 MG/DL    Creatinine 1.45 (H) 0.70 - 1.30 MG/DL    BUN/Creatinine ratio 16 12 - 20      GFR est AA 59 (L) >60 ml/min/1.73m2    GFR est non-AA 49 (L) >60 ml/min/1.73m2    Calcium 9.9 8.5 - 10.1 MG/DL    Bilirubin, total 0.4 0.2 - 1.0 MG/DL    ALT (SGPT) 20 12 - 78 U/L    AST (SGOT) 16 15 - 37 U/L    Alk.  phosphatase 33 (L) 45 - 117 U/L    Protein, total 7.4 6.4 - 8.2 g/dL    Albumin 4.2 3.5 - 5.0 g/dL    Globulin 3.2 2.0 - 4.0 g/dL    A-G Ratio 1.3 1.1 - 2.2     LIPID PANEL   Result Value Ref Range    Cholesterol, total 164 <200 MG/DL    Triglyceride 173 (H) <150 MG/DL    HDL Cholesterol 42 MG/DL    LDL, calculated 87.4 0 - 100 MG/DL    VLDL, calculated 34.6 MG/DL    CHOL/HDL Ratio 3.9 0.0 - 5.0     CBC WITH AUTOMATED DIFF   Result Value Ref Range    WBC 4.6 4.1 - 11.1 K/uL    RBC 3.66 (L) 4.10 - 5.70 M/uL    HGB 11.4 (L) 12.1 - 17.0 g/dL    HCT 34.2 (L) 36.6 - 50.3 %    MCV 93.4 80.0 - 99.0 FL    MCH 31.1 26.0 - 34.0 PG    MCHC 33.3 30.0 - 36.5 g/dL    RDW 13.4 11.5 - 14.5 %    PLATELET 496 300 - 836 K/uL    MPV 10.3 8.9 - 12.9 FL    NRBC 0.0 0  WBC    ABSOLUTE NRBC 0.00 0.00 - 0.01 K/uL    NEUTROPHILS 55 32 - 75 %    LYMPHOCYTES 33 12 - 49 %    MONOCYTES 9 5 - 13 %    EOSINOPHILS 2 0 - 7 %    BASOPHILS 1 0 - 1 %    IMMATURE GRANULOCYTES 0 0.0 - 0.5 %    ABS. NEUTROPHILS 2.5 1.8 - 8.0 K/UL    ABS. LYMPHOCYTES 1.5 0.8 - 3.5 K/UL    ABS. MONOCYTES 0.4 0.0 - 1.0 K/UL    ABS. EOSINOPHILS 0.1 0.0 - 0.4 K/UL    ABS. BASOPHILS 0.1 0.0 - 0.1 K/UL    ABS. IMM. GRANS. 0.0 0.00 - 0.04 K/UL    DF AUTOMATED         No results found for this visit on 01/09/23. Assessment/ Plan:     1. Stage 3a chronic kidney disease ([facility] [address]  Followed by specialist Dr. Jon Mar nephrologist in [address] DIFF; Future  - LIPID PANEL; Future  - METABOLIC PANEL, COMPREHENSIVE; Future  - COLLECTION VENOUS BLOOD,VENIPUNCTURE; Future  - HEMOGLOBIN A1C WITH EAG; Future    2. Essential hypertension, benign   In goal continue   - CBC WITH AUTOMATED DIFF; Future  - LIPID PANEL; Future  - METABOLIC PANEL, COMPREHENSIVE; Future  - COLLECTION VENOUS BLOOD,VENIPUNCTURE; Future  - HEMOGLOBIN A1C WITH EAG; Future    3.  Hypercholesterolemia  BP in goal continue     lisinopril-hydroCHLOROthiazide (PRINZIDE, ZESTORETIC) 20-25 mg per tablet, TAKE 1 TABLET BY MOUTH EVERY DAY, Disp: 90 Tablet, Rfl: 3    verapamil ER (CALAN-SR) 240 mg CR tablet, TAKE 2 TABLETS BY MOUTH DAILY  Indications: high blood pressure, Disp: 180 Tablet, Rfl: 3    fenofibrate (LOFIBRA) 160 mg tablet, TAKE 1 TABLET BY MOUTH DAILY, Disp: 90 Tablet, Rfl: 3    atorvastatin (LIPITOR) 10 mg tablet, Take 1 Tablet by mouth daily. , Disp: 90 Tablet, Rfl: 1  - CBC WITH AUTOMATED DIFF; Future  - LIPID PANEL; Future  - METABOLIC PANEL, COMPREHENSIVE; Future  - COLLECTION VENOUS BLOOD,VENIPUNCTURE; Future  - HEMOGLOBIN A1C WITH EAG; Future    4. Mixed hyperlipidemia    lisinopril-hydroCHLOROthiazide (PRINZIDE, ZESTORETIC) 20-25 mg per tablet, TAKE 1 TABLET BY MOUTH EVERY DAY, Disp: 90 Tablet, Rfl: 3    verapamil ER (CALAN-SR) 240 mg CR tablet, TAKE 2 TABLETS BY MOUTH DAILY  Indications: high blood pressure, Disp: 180 Tablet, Rfl: 3    fenofibrate (LOFIBRA) 160 mg tablet, TAKE 1 TABLET BY MOUTH DAILY, Disp: 90 Tablet, Rfl: 3    atorvastatin (LIPITOR) 10 mg tablet, Take 1 Tablet by mouth daily. , Disp: 90 Tablet, Rfl: 1  - CBC WITH AUTOMATED DIFF; Future  - LIPID PANEL; Future  - METABOLIC PANEL, COMPREHENSIVE; Future  - COLLECTION VENOUS BLOOD,VENIPUNCTURE; Future  - HEMOGLOBIN A1C WITH EAG; Future    5. Elevated glucose  Prediabetes controlled with diet   - COLLECTION VENOUS BLOOD,VENIPUNCTURE; Future  - HEMOGLOBIN A1C WITH EAG; Future      Orders Placed This Encounter    COLLECTION VENOUS BLOOD,VENIPUNCTURE     Standing Status:   Future     Standing Expiration Date:   2/9/2023    CBC WITH AUTOMATED DIFF     Standing Status:   Future     Standing Expiration Date:   2/9/2023    LIPID PANEL     Standing Status:   Future     Standing Expiration Date:   6/2/0310    METABOLIC PANEL, COMPREHENSIVE     Standing Status:   Future     Standing Expiration Date:   2/9/2023    HEMOGLOBIN A1C WITH EAG     Standing Status:   Future     Standing Expiration Date:   2/9/2023    bran-gum-fib-nellie-psyl-kelp-pec (Fiber 6) 1,000 mg tab     Sig: Take 1,000 mg by mouth daily. Verbal and written instructions (see AVS) provided. Patient expresses understanding of diagnosis and treatment plan.     Health Maintenance Due   Topic Date Due    Colorectal Cancer Screening Combo  01/11/2023         Follow-up and Dispositions    Return in about 6 months (around 7/9/2023).            CATHERINE Benitez

## 2023-01-10 LAB
ALBUMIN SERPL-MCNC: 4.4 G/DL (ref 3.5–5)
ALBUMIN/GLOB SERPL: 1.6 (ref 1.1–2.2)
ALP SERPL-CCNC: 36 U/L (ref 45–117)
ALT SERPL-CCNC: 20 U/L (ref 12–78)
ANION GAP SERPL CALC-SCNC: 3 MMOL/L (ref 5–15)
AST SERPL-CCNC: 13 U/L (ref 15–37)
BASOPHILS # BLD: 0 K/UL (ref 0–0.1)
BASOPHILS NFR BLD: 1 % (ref 0–1)
BILIRUB SERPL-MCNC: 0.3 MG/DL (ref 0.2–1)
BUN SERPL-MCNC: 14 MG/DL (ref 6–20)
BUN/CREAT SERPL: 10 (ref 12–20)
CALCIUM SERPL-MCNC: 10.1 MG/DL (ref 8.5–10.1)
CHLORIDE SERPL-SCNC: 108 MMOL/L (ref 97–108)
CHOLEST SERPL-MCNC: 136 MG/DL
CO2 SERPL-SCNC: 28 MMOL/L (ref 21–32)
COMMENT, HOLDF: NORMAL
CREAT SERPL-MCNC: 1.38 MG/DL (ref 0.7–1.3)
DIFFERENTIAL METHOD BLD: ABNORMAL
EOSINOPHIL # BLD: 0 K/UL (ref 0–0.4)
EOSINOPHIL NFR BLD: 1 % (ref 0–7)
ERYTHROCYTE [DISTWIDTH] IN BLOOD BY AUTOMATED COUNT: 13.8 % (ref 11.5–14.5)
EST. AVERAGE GLUCOSE BLD GHB EST-MCNC: 126 MG/DL
GLOBULIN SER CALC-MCNC: 2.8 G/DL (ref 2–4)
GLUCOSE SERPL-MCNC: 113 MG/DL (ref 65–100)
HBA1C MFR BLD: 6 % (ref 4–5.6)
HCT VFR BLD AUTO: 37.9 % (ref 36.6–50.3)
HDLC SERPL-MCNC: 44 MG/DL
HDLC SERPL: 3.1 (ref 0–5)
HGB BLD-MCNC: 12.1 G/DL (ref 12.1–17)
IMM GRANULOCYTES # BLD AUTO: 0 K/UL (ref 0–0.04)
IMM GRANULOCYTES NFR BLD AUTO: 0 % (ref 0–0.5)
LDLC SERPL CALC-MCNC: 72 MG/DL (ref 0–100)
LYMPHOCYTES # BLD: 1.2 K/UL (ref 0.8–3.5)
LYMPHOCYTES NFR BLD: 30 % (ref 12–49)
MCH RBC QN AUTO: 29.4 PG (ref 26–34)
MCHC RBC AUTO-ENTMCNC: 31.9 G/DL (ref 30–36.5)
MCV RBC AUTO: 92.2 FL (ref 80–99)
MONOCYTES # BLD: 0.5 K/UL (ref 0–1)
MONOCYTES NFR BLD: 11 % (ref 5–13)
NEUTS SEG # BLD: 2.3 K/UL (ref 1.8–8)
NEUTS SEG NFR BLD: 57 % (ref 32–75)
NRBC # BLD: 0 K/UL (ref 0–0.01)
NRBC BLD-RTO: 0 PER 100 WBC
PLATELET # BLD AUTO: 417 K/UL (ref 150–400)
PMV BLD AUTO: 10.2 FL (ref 8.9–12.9)
POTASSIUM SERPL-SCNC: 4.2 MMOL/L (ref 3.5–5.1)
PROT SERPL-MCNC: 7.2 G/DL (ref 6.4–8.2)
RBC # BLD AUTO: 4.11 M/UL (ref 4.1–5.7)
SAMPLES BEING HELD,HOLD: NORMAL
SODIUM SERPL-SCNC: 139 MMOL/L (ref 136–145)
TRIGL SERPL-MCNC: 100 MG/DL (ref ?–150)
VLDLC SERPL CALC-MCNC: 20 MG/DL
WBC # BLD AUTO: 4.1 K/UL (ref 4.1–11.1)

## 2023-01-16 RX ORDER — ATORVASTATIN CALCIUM 10 MG/1
10 TABLET, FILM COATED ORAL DAILY
Qty: 90 TABLET | Refills: 1 | Status: SHIPPED | OUTPATIENT
Start: 2023-01-16

## 2023-05-17 RX ORDER — AZITHROMYCIN 250 MG/1
TABLET, FILM COATED ORAL
COMMUNITY
Start: 2022-12-06 | End: 2023-07-10 | Stop reason: ALTCHOICE

## 2023-05-17 RX ORDER — METHYLPREDNISOLONE 4 MG/1
TABLET ORAL
COMMUNITY
Start: 2022-12-06 | End: 2023-07-10

## 2023-05-17 RX ORDER — VERAPAMIL HYDROCHLORIDE 240 MG/1
TABLET, FILM COATED, EXTENDED RELEASE ORAL
COMMUNITY
Start: 2022-11-14

## 2023-05-17 RX ORDER — PRAMOXINE HYDROCHLORIDE HYDROCORTISONE ACETATE 100; 100 MG/10G; MG/10G
1 AEROSOL, FOAM TOPICAL 2 TIMES DAILY
COMMUNITY
Start: 2022-01-11

## 2023-05-17 RX ORDER — ATORVASTATIN CALCIUM 10 MG/1
10 TABLET, FILM COATED ORAL DAILY
COMMUNITY
Start: 2023-01-16

## 2023-05-17 RX ORDER — FERROUS SULFATE 137(45) MG
142 TABLET, EXTENDED RELEASE ORAL
COMMUNITY
Start: 2021-08-03

## 2023-05-17 RX ORDER — FENOFIBRATE 160 MG/1
160 TABLET ORAL DAILY
COMMUNITY
Start: 2022-11-02

## 2023-05-17 RX ORDER — LISINOPRIL AND HYDROCHLOROTHIAZIDE 25; 20 MG/1; MG/1
1 TABLET ORAL DAILY
COMMUNITY
Start: 2022-12-01

## 2023-05-17 RX ORDER — DIBUCAINE 0.28 G/28G
OINTMENT TOPICAL EVERY 4 HOURS PRN
COMMUNITY
Start: 2022-01-11

## 2023-07-10 ENCOUNTER — OFFICE VISIT (OUTPATIENT)
Age: 68
End: 2023-07-10
Payer: COMMERCIAL

## 2023-07-10 VITALS
DIASTOLIC BLOOD PRESSURE: 80 MMHG | HEART RATE: 72 BPM | RESPIRATION RATE: 18 BRPM | BODY MASS INDEX: 29.19 KG/M2 | TEMPERATURE: 97.3 F | HEIGHT: 64 IN | WEIGHT: 171 LBS | OXYGEN SATURATION: 97 % | SYSTOLIC BLOOD PRESSURE: 138 MMHG

## 2023-07-10 DIAGNOSIS — R35.1 NOCTURIA: ICD-10-CM

## 2023-07-10 DIAGNOSIS — Z00.00 MEDICARE ANNUAL WELLNESS VISIT, SUBSEQUENT: Primary | ICD-10-CM

## 2023-07-10 PROCEDURE — 1123F ACP DISCUSS/DSCN MKR DOCD: CPT | Performed by: NURSE PRACTITIONER

## 2023-07-10 PROCEDURE — 3079F DIAST BP 80-89 MM HG: CPT | Performed by: NURSE PRACTITIONER

## 2023-07-10 PROCEDURE — G0439 PPPS, SUBSEQ VISIT: HCPCS | Performed by: NURSE PRACTITIONER

## 2023-07-10 PROCEDURE — 3075F SYST BP GE 130 - 139MM HG: CPT | Performed by: NURSE PRACTITIONER

## 2023-07-10 PROCEDURE — 3017F COLORECTAL CA SCREEN DOC REV: CPT | Performed by: NURSE PRACTITIONER

## 2023-07-10 SDOH — ECONOMIC STABILITY: HOUSING INSECURITY
IN THE LAST 12 MONTHS, WAS THERE A TIME WHEN YOU DID NOT HAVE A STEADY PLACE TO SLEEP OR SLEPT IN A SHELTER (INCLUDING NOW)?: NO

## 2023-07-10 SDOH — ECONOMIC STABILITY: TRANSPORTATION INSECURITY
IN THE PAST 12 MONTHS, HAS LACK OF TRANSPORTATION KEPT YOU FROM MEETINGS, WORK, OR FROM GETTING THINGS NEEDED FOR DAILY LIVING?: NO

## 2023-07-10 SDOH — ECONOMIC STABILITY: INCOME INSECURITY: IN THE LAST 12 MONTHS, WAS THERE A TIME WHEN YOU WERE NOT ABLE TO PAY THE MORTGAGE OR RENT ON TIME?: NO

## 2023-07-10 SDOH — ECONOMIC STABILITY: FOOD INSECURITY: WITHIN THE PAST 12 MONTHS, YOU WORRIED THAT YOUR FOOD WOULD RUN OUT BEFORE YOU GOT MONEY TO BUY MORE.: NEVER TRUE

## 2023-07-10 SDOH — ECONOMIC STABILITY: TRANSPORTATION INSECURITY
IN THE PAST 12 MONTHS, HAS THE LACK OF TRANSPORTATION KEPT YOU FROM MEDICAL APPOINTMENTS OR FROM GETTING MEDICATIONS?: NO

## 2023-07-10 SDOH — ECONOMIC STABILITY: HOUSING INSECURITY: IN THE LAST 12 MONTHS, HOW MANY PLACES HAVE YOU LIVED?: 1

## 2023-07-10 SDOH — ECONOMIC STABILITY: FOOD INSECURITY: WITHIN THE PAST 12 MONTHS, THE FOOD YOU BOUGHT JUST DIDN'T LAST AND YOU DIDN'T HAVE MONEY TO GET MORE.: NEVER TRUE

## 2023-07-10 SDOH — HEALTH STABILITY: PHYSICAL HEALTH: ON AVERAGE, HOW MANY MINUTES DO YOU ENGAGE IN EXERCISE AT THIS LEVEL?: 0 MIN

## 2023-07-10 SDOH — HEALTH STABILITY: PHYSICAL HEALTH: ON AVERAGE, HOW MANY DAYS PER WEEK DO YOU ENGAGE IN MODERATE TO STRENUOUS EXERCISE (LIKE A BRISK WALK)?: 0 DAYS

## 2023-07-10 ASSESSMENT — LIFESTYLE VARIABLES
HOW OFTEN DO YOU HAVE A DRINK CONTAINING ALCOHOL: 2-3 TIMES A WEEK
HOW MANY STANDARD DRINKS CONTAINING ALCOHOL DO YOU HAVE ON A TYPICAL DAY: 1 OR 2
HOW MANY STANDARD DRINKS CONTAINING ALCOHOL DO YOU HAVE ON A TYPICAL DAY: PATIENT DOES NOT DRINK
HOW OFTEN DO YOU HAVE A DRINK CONTAINING ALCOHOL: NEVER

## 2023-07-10 ASSESSMENT — PATIENT HEALTH QUESTIONNAIRE - PHQ9
SUM OF ALL RESPONSES TO PHQ QUESTIONS 1-9: 0
SUM OF ALL RESPONSES TO PHQ9 QUESTIONS 1 & 2: 0
2. FEELING DOWN, DEPRESSED OR HOPELESS: NOT AT ALL
SUM OF ALL RESPONSES TO PHQ QUESTIONS 1-9: 0
SUM OF ALL RESPONSES TO PHQ QUESTIONS 1-9: 0
1. LITTLE INTEREST OR PLEASURE IN DOING THINGS: NOT AT ALL
1. LITTLE INTEREST OR PLEASURE IN DOING THINGS: 0
SUM OF ALL RESPONSES TO PHQ QUESTIONS 1-9: 0
2. FEELING DOWN, DEPRESSED OR HOPELESS: 0
SUM OF ALL RESPONSES TO PHQ9 QUESTIONS 1 & 2: 0

## 2023-07-10 ASSESSMENT — SOCIAL DETERMINANTS OF HEALTH (SDOH)
IN A TYPICAL WEEK, HOW MANY TIMES DO YOU TALK ON THE PHONE WITH FAMILY, FRIENDS, OR NEIGHBORS?: MORE THAN THREE TIMES A WEEK
HOW OFTEN DO YOU GET TOGETHER WITH FRIENDS OR RELATIVES?: MORE THAN THREE TIMES A WEEK
HOW OFTEN DO YOU ATTENT MEETINGS OF THE CLUB OR ORGANIZATION YOU BELONG TO?: NEVER
WITHIN THE LAST YEAR, HAVE YOU BEEN KICKED, HIT, SLAPPED, OR OTHERWISE PHYSICALLY HURT BY YOUR PARTNER OR EX-PARTNER?: NO
DO YOU BELONG TO ANY CLUBS OR ORGANIZATIONS SUCH AS CHURCH GROUPS UNIONS, FRATERNAL OR ATHLETIC GROUPS, OR SCHOOL GROUPS?: NO
HOW OFTEN DO YOU ATTEND CHURCH OR RELIGIOUS SERVICES?: NEVER
WITHIN THE LAST YEAR, HAVE YOU BEEN HUMILIATED OR EMOTIONALLY ABUSED IN OTHER WAYS BY YOUR PARTNER OR EX-PARTNER?: NO
WITHIN THE LAST YEAR, HAVE TO BEEN RAPED OR FORCED TO HAVE ANY KIND OF SEXUAL ACTIVITY BY YOUR PARTNER OR EX-PARTNER?: NO
WITHIN THE LAST YEAR, HAVE YOU BEEN AFRAID OF YOUR PARTNER OR EX-PARTNER?: NO
HOW HARD IS IT FOR YOU TO PAY FOR THE VERY BASICS LIKE FOOD, HOUSING, MEDICAL CARE, AND HEATING?: NOT HARD AT ALL

## 2023-07-10 NOTE — PATIENT INSTRUCTIONS
toothpaste dispensers, and floss holders. Your doctor may recommend a soft-bristle toothbrush if the person you care for bleeds easily. Bleeding can happen because of a health problem or from certain medicines. A toothpaste for sensitive teeth may help if the person you care for has sensitive teeth. How do you brush and floss someone's teeth? If the person you are caring for has a hard time cleaning their teeth on their own, you may need to brush and floss their teeth for them. It may be easiest to have the person sit and face away from you, and to sit or stand behind them. That way you can steady their head against your arm as you reach around to floss and brush their teeth. Choose a place that has good lighting and is comfortable for both of you. Before you begin, gather your supplies. You will need gloves, floss, a toothbrush, and a container to hold water if you are not near a sink. Wash and dry your hands well and put on gloves. Start by flossing:  Gently work a piece of floss between each of the teeth toward the gums. A plastic flossing tool may make this easier, and they are available at [facility]. Curve the floss around each tooth into a U-shape and gently slide it under the gum line. Move the floss firmly up and down several times to scrape off the plaque. After you've finished flossing, throw away the used floss and begin brushing:  Wet the brush and apply toothpaste. Place the brush at a 45-degree angle where the teeth meet the gums. Press firmly, and move the brush in small circles over the surface of the teeth. Be careful not to brush too hard. Vigorous brushing can make the gums pull away from the teeth and can scratch the tooth enamel. Brush all surfaces of the teeth, on the tongue side and on the cheek side. Pay special attention to the front teeth and all surfaces of the back teeth. Brush chewing surfaces with short back-and-forth strokes.   After you've finished, help the person

## 2023-07-11 LAB — PSA SERPL-MCNC: 0.8 NG/ML (ref 0.01–4)

## 2023-08-09 RX ORDER — FENOFIBRATE 160 MG/1
160 TABLET ORAL DAILY
Qty: 30 TABLET | Refills: 1 | Status: SHIPPED | OUTPATIENT
Start: 2023-08-09 | End: 2023-08-10

## 2023-08-09 RX ORDER — VERAPAMIL HYDROCHLORIDE 240 MG/1
TABLET, FILM COATED, EXTENDED RELEASE ORAL
Qty: 30 TABLET | Refills: 1 | Status: SHIPPED | OUTPATIENT
Start: 2023-08-09 | End: 2023-08-10

## 2023-08-09 RX ORDER — ATORVASTATIN CALCIUM 10 MG/1
10 TABLET, FILM COATED ORAL DAILY
Qty: 30 TABLET | Refills: 1 | Status: SHIPPED | OUTPATIENT
Start: 2023-08-09 | End: 2023-08-10

## 2023-08-09 NOTE — TELEPHONE ENCOUNTER
Medication Refill Request    Bryce Gaviria is requesting a refill of the following medication(s):   verapamil (CALAN SR) 240 MG extended release tablet  fenofibrate (TRIGLIDE) 160 MG tablet  atorvastatin (LIPITOR) 10 MG tablet  Please send refill to:     [address] [phone]  [facility] [address]  Phone: [phone] Fax: [phone]

## 2023-08-10 RX ORDER — ATORVASTATIN CALCIUM 10 MG/1
10 TABLET, FILM COATED ORAL DAILY
Qty: 90 TABLET | Refills: 1 | Status: SHIPPED | OUTPATIENT
Start: 2023-08-10

## 2023-08-10 RX ORDER — FENOFIBRATE 160 MG/1
160 TABLET ORAL DAILY
Qty: 90 TABLET | Refills: 1 | Status: SHIPPED | OUTPATIENT
Start: 2023-08-10

## 2023-08-10 RX ORDER — VERAPAMIL HYDROCHLORIDE 240 MG/1
TABLET, FILM COATED, EXTENDED RELEASE ORAL
Qty: 180 TABLET | Refills: 1 | Status: SHIPPED | OUTPATIENT
Start: 2023-08-10

## 2023-09-05 RX ORDER — ATORVASTATIN CALCIUM 10 MG/1
10 TABLET, FILM COATED ORAL DAILY
Qty: 90 TABLET | Refills: 1 | Status: SHIPPED | OUTPATIENT
Start: 2023-09-05

## 2023-09-12 RX ORDER — FENOFIBRATE 160 MG/1
160 TABLET ORAL DAILY
Qty: 90 TABLET | Refills: 1 | Status: SHIPPED | OUTPATIENT
Start: 2023-09-12

## 2023-09-18 RX ORDER — VERAPAMIL HYDROCHLORIDE 240 MG/1
TABLET, FILM COATED, EXTENDED RELEASE ORAL
Qty: 180 TABLET | Refills: 1 | Status: SHIPPED | OUTPATIENT
Start: 2023-09-18

## 2023-09-18 NOTE — TELEPHONE ENCOUNTER
Medication Refill Request    Mal Kelley is requesting a refill of the following medication(s):   verapamil (CALAN SR) 240 MG extended release tablet     Please send refill to:     [address] [facility] [address], [address]  [address] [facility] [address]  Phone: [phone] Fax: [phone]

## 2023-10-16 RX ORDER — ATORVASTATIN CALCIUM 10 MG/1
10 TABLET, FILM COATED ORAL DAILY
Qty: 90 TABLET | Refills: 1 | Status: SHIPPED | OUTPATIENT
Start: 2023-10-16

## 2023-10-16 NOTE — TELEPHONE ENCOUNTER
Medication Refill Request    Pop José is requesting a refill of the following medication(s):   atorvastatin (LIPITOR) 10 MG tablet  Please send refill to:     [address] [facility] [address], [address] [place] [address] [address] [facility] [phone]  [address] [facility] [address]  Phone: [phone] Fax: [phone]

## 2023-10-25 ENCOUNTER — OFFICE VISIT (OUTPATIENT)
Age: 68
End: 2023-10-25
Payer: MEDICARE

## 2023-10-25 VITALS
WEIGHT: 171 LBS | TEMPERATURE: 97.5 F | HEART RATE: 67 BPM | HEIGHT: 63 IN | OXYGEN SATURATION: 99 % | BODY MASS INDEX: 30.3 KG/M2 | RESPIRATION RATE: 18 BRPM

## 2023-10-25 DIAGNOSIS — N18.31 STAGE 3A CHRONIC KIDNEY DISEASE (HCC): Primary | ICD-10-CM

## 2023-10-25 DIAGNOSIS — I10 ESSENTIAL HYPERTENSION, BENIGN: ICD-10-CM

## 2023-10-25 DIAGNOSIS — Z23 NEEDS FLU SHOT: ICD-10-CM

## 2023-10-25 PROCEDURE — 1036F TOBACCO NON-USER: CPT | Performed by: NURSE PRACTITIONER

## 2023-10-25 PROCEDURE — 90694 VACC AIIV4 NO PRSRV 0.5ML IM: CPT | Performed by: NURSE PRACTITIONER

## 2023-10-25 PROCEDURE — 3017F COLORECTAL CA SCREEN DOC REV: CPT | Performed by: NURSE PRACTITIONER

## 2023-10-25 PROCEDURE — G8417 CALC BMI ABV UP PARAM F/U: HCPCS | Performed by: NURSE PRACTITIONER

## 2023-10-25 PROCEDURE — 99213 OFFICE O/P EST LOW 20 MIN: CPT | Performed by: NURSE PRACTITIONER

## 2023-10-25 PROCEDURE — G8427 DOCREV CUR MEDS BY ELIG CLIN: HCPCS | Performed by: NURSE PRACTITIONER

## 2023-10-25 PROCEDURE — 1123F ACP DISCUSS/DSCN MKR DOCD: CPT | Performed by: NURSE PRACTITIONER

## 2023-10-25 PROCEDURE — G0008 ADMIN INFLUENZA VIRUS VAC: HCPCS | Performed by: NURSE PRACTITIONER

## 2023-10-25 PROCEDURE — G8484 FLU IMMUNIZE NO ADMIN: HCPCS | Performed by: NURSE PRACTITIONER

## 2023-10-25 ASSESSMENT — PATIENT HEALTH QUESTIONNAIRE - PHQ9
SUM OF ALL RESPONSES TO PHQ QUESTIONS 1-9: 0
1. LITTLE INTEREST OR PLEASURE IN DOING THINGS: 0
SUM OF ALL RESPONSES TO PHQ QUESTIONS 1-9: 0
SUM OF ALL RESPONSES TO PHQ9 QUESTIONS 1 & 2: 0
2. FEELING DOWN, DEPRESSED OR HOPELESS: 0
SUM OF ALL RESPONSES TO PHQ QUESTIONS 1-9: 0
SUM OF ALL RESPONSES TO PHQ QUESTIONS 1-9: 0

## 2023-10-25 NOTE — PATIENT INSTRUCTIONS

## 2023-10-26 NOTE — PROGRESS NOTES
Subjective:     Nahomi Ojeda is a 80 y.o. male who presents today with the following:  Chief Complaint   Patient presents with    Hypertension       Patient Active Problem List   Diagnosis    Essential hypertension, benign    Hyperlipemia    CKD (chronic kidney disease) stage 3, GFR 30-59 ml/min ([facility])         COMPLIANT WITH MEDICATION:   HTN; Denies chest pain, dyspnea, palpitations, headache and blurred vision. Blood pressure directly to his nephrologist who is managing elevated. Mr. Mor Pacheco endorses he has been taking his BP daily and recordings are going .  ionse discussed taking BP consistently approximately as hour after t    depression screening addressed not at risk    abuse screening addressed denies    learning assessment addressed reviewed nurses notes    fall risk addressed not at risk    HM: addressed    ROS:  Gen: denies fever, chills, fatigue, weight loss, weight gain  HEENT:denies blurry vision, nasal congestion, sore throat  Resp: denies dypsnea, cough, wheezing  CV: denies chest pain radiating to the jaws or arms, palpitations, lower extremity edema  Abd: denies nausea, vomiting, diarrhea, constipation  Neuro: denies numbness/tingling  Endo: denies polyuria, polydipsia, heat/cold intolerance  Heme: no lymphadenopathy    No Known Allergies      Current Outpatient Medications:     atorvastatin (LIPITOR) 10 MG tablet, Take 1 tablet by mouth daily, Disp: 90 tablet, Rfl: 1    verapamil (CALAN SR) 240 MG extended release tablet, TAKE 2 TABLETS BY MOUTH DAILY FOR HIGH BLOOD PRESSURE, Disp: 180 tablet, Rfl: 1    fenofibrate (TRIGLIDE) 160 MG tablet, TAKE 1 TABLET BY MOUTH DAILY, Disp: 90 tablet, Rfl: 1    aluminum-magnesium hydroxide 200-200 MG/5ML suspension, Take 15 mLs by mouth every 6 hours as needed, Disp: , Rfl:     ferrous sulfate (SLOW FE) 142 (45 Fe) MG extended release tablet, Take 142 mg by mouth every morning (before breakfast), Disp: , Rfl:     lisinopril-hydroCHLOROthiazide

## 2023-10-30 ENCOUNTER — OFFICE VISIT (OUTPATIENT)
Age: 68
End: 2023-10-30
Payer: MEDICARE

## 2023-10-30 VITALS
RESPIRATION RATE: 18 BRPM | HEIGHT: 64 IN | DIASTOLIC BLOOD PRESSURE: 70 MMHG | BODY MASS INDEX: 29.3 KG/M2 | WEIGHT: 171.6 LBS | SYSTOLIC BLOOD PRESSURE: 140 MMHG | OXYGEN SATURATION: 99 % | HEART RATE: 83 BPM | TEMPERATURE: 98 F

## 2023-10-30 DIAGNOSIS — I10 ESSENTIAL HYPERTENSION, BENIGN: Primary | ICD-10-CM

## 2023-10-30 PROCEDURE — 1123F ACP DISCUSS/DSCN MKR DOCD: CPT | Performed by: NURSE PRACTITIONER

## 2023-10-30 PROCEDURE — G8484 FLU IMMUNIZE NO ADMIN: HCPCS | Performed by: NURSE PRACTITIONER

## 2023-10-30 PROCEDURE — 3017F COLORECTAL CA SCREEN DOC REV: CPT | Performed by: NURSE PRACTITIONER

## 2023-10-30 PROCEDURE — 99213 OFFICE O/P EST LOW 20 MIN: CPT | Performed by: NURSE PRACTITIONER

## 2023-10-30 PROCEDURE — G8427 DOCREV CUR MEDS BY ELIG CLIN: HCPCS | Performed by: NURSE PRACTITIONER

## 2023-10-30 PROCEDURE — G8417 CALC BMI ABV UP PARAM F/U: HCPCS | Performed by: NURSE PRACTITIONER

## 2023-10-30 PROCEDURE — 1036F TOBACCO NON-USER: CPT | Performed by: NURSE PRACTITIONER

## 2023-10-30 PROCEDURE — 3078F DIAST BP <80 MM HG: CPT | Performed by: NURSE PRACTITIONER

## 2023-10-30 PROCEDURE — 3077F SYST BP >= 140 MM HG: CPT | Performed by: NURSE PRACTITIONER

## 2023-10-30 ASSESSMENT — PATIENT HEALTH QUESTIONNAIRE - PHQ9
2. FEELING DOWN, DEPRESSED OR HOPELESS: 0
SUM OF ALL RESPONSES TO PHQ QUESTIONS 1-9: 0
1. LITTLE INTEREST OR PLEASURE IN DOING THINGS: 0
SUM OF ALL RESPONSES TO PHQ9 QUESTIONS 1 & 2: 0
SUM OF ALL RESPONSES TO PHQ QUESTIONS 1-9: 0

## 2023-11-06 ENCOUNTER — TELEPHONE (OUTPATIENT)
Age: 68
End: 2023-11-06

## 2023-11-06 NOTE — TELEPHONE ENCOUNTER
Paul Salazar says his BP has been a little high for a couple of days. Running 184-84 and 189-80. It will go down in the afternoons. Wants to know if there is anything he should be doing or would Mainor Hurd need to see him again.

## 2023-11-09 ENCOUNTER — OFFICE VISIT (OUTPATIENT)
Age: 68
End: 2023-11-09
Payer: MEDICARE

## 2023-11-09 VITALS
HEART RATE: 97 BPM | HEIGHT: 64 IN | OXYGEN SATURATION: 98 % | SYSTOLIC BLOOD PRESSURE: 164 MMHG | RESPIRATION RATE: 18 BRPM | BODY MASS INDEX: 28.85 KG/M2 | TEMPERATURE: 97 F | WEIGHT: 169 LBS | DIASTOLIC BLOOD PRESSURE: 70 MMHG

## 2023-11-09 DIAGNOSIS — I10 ESSENTIAL HYPERTENSION, BENIGN: Primary | ICD-10-CM

## 2023-11-09 PROCEDURE — 1036F TOBACCO NON-USER: CPT | Performed by: NURSE PRACTITIONER

## 2023-11-09 PROCEDURE — 3077F SYST BP >= 140 MM HG: CPT | Performed by: NURSE PRACTITIONER

## 2023-11-09 PROCEDURE — 1123F ACP DISCUSS/DSCN MKR DOCD: CPT | Performed by: NURSE PRACTITIONER

## 2023-11-09 PROCEDURE — G8484 FLU IMMUNIZE NO ADMIN: HCPCS | Performed by: NURSE PRACTITIONER

## 2023-11-09 PROCEDURE — 99213 OFFICE O/P EST LOW 20 MIN: CPT | Performed by: NURSE PRACTITIONER

## 2023-11-09 PROCEDURE — G8427 DOCREV CUR MEDS BY ELIG CLIN: HCPCS | Performed by: NURSE PRACTITIONER

## 2023-11-09 PROCEDURE — 3017F COLORECTAL CA SCREEN DOC REV: CPT | Performed by: NURSE PRACTITIONER

## 2023-11-09 PROCEDURE — G8417 CALC BMI ABV UP PARAM F/U: HCPCS | Performed by: NURSE PRACTITIONER

## 2023-11-09 PROCEDURE — 3078F DIAST BP <80 MM HG: CPT | Performed by: NURSE PRACTITIONER

## 2023-11-09 ASSESSMENT — PATIENT HEALTH QUESTIONNAIRE - PHQ9
SUM OF ALL RESPONSES TO PHQ9 QUESTIONS 1 & 2: 0
SUM OF ALL RESPONSES TO PHQ QUESTIONS 1-9: 0
1. LITTLE INTEREST OR PLEASURE IN DOING THINGS: 0
SUM OF ALL RESPONSES TO PHQ QUESTIONS 1-9: 0
2. FEELING DOWN, DEPRESSED OR HOPELESS: 0

## 2023-11-21 ENCOUNTER — OFFICE VISIT (OUTPATIENT)
Age: 68
End: 2023-11-21
Payer: MEDICARE

## 2023-11-21 VITALS
SYSTOLIC BLOOD PRESSURE: 136 MMHG | TEMPERATURE: 97.7 F | DIASTOLIC BLOOD PRESSURE: 68 MMHG | HEIGHT: 64 IN | RESPIRATION RATE: 18 BRPM | HEART RATE: 79 BPM | WEIGHT: 170 LBS | BODY MASS INDEX: 29.02 KG/M2 | OXYGEN SATURATION: 96 %

## 2023-11-21 DIAGNOSIS — I10 ESSENTIAL HYPERTENSION, BENIGN: Primary | ICD-10-CM

## 2023-11-21 PROCEDURE — G8427 DOCREV CUR MEDS BY ELIG CLIN: HCPCS | Performed by: NURSE PRACTITIONER

## 2023-11-21 PROCEDURE — 3075F SYST BP GE 130 - 139MM HG: CPT | Performed by: NURSE PRACTITIONER

## 2023-11-21 PROCEDURE — G8417 CALC BMI ABV UP PARAM F/U: HCPCS | Performed by: NURSE PRACTITIONER

## 2023-11-21 PROCEDURE — 1123F ACP DISCUSS/DSCN MKR DOCD: CPT | Performed by: NURSE PRACTITIONER

## 2023-11-21 PROCEDURE — 3017F COLORECTAL CA SCREEN DOC REV: CPT | Performed by: NURSE PRACTITIONER

## 2023-11-21 PROCEDURE — 99213 OFFICE O/P EST LOW 20 MIN: CPT | Performed by: NURSE PRACTITIONER

## 2023-11-21 PROCEDURE — 3078F DIAST BP <80 MM HG: CPT | Performed by: NURSE PRACTITIONER

## 2023-11-21 PROCEDURE — 1036F TOBACCO NON-USER: CPT | Performed by: NURSE PRACTITIONER

## 2023-11-21 PROCEDURE — G8484 FLU IMMUNIZE NO ADMIN: HCPCS | Performed by: NURSE PRACTITIONER

## 2023-11-22 NOTE — PROGRESS NOTES
Subjective:     César Denney is a 80 y.o. male who presents today with the following:  Chief Complaint   Patient presents with    Hypertension     followup       Patient Active Problem List   Diagnosis    Essential hypertension, benign    Hyperlipemia    CKD (chronic kidney disease) stage 3, GFR 30-59 ml/min ([facility])         COMPLIANT WITH MEDICATION:   HTN; Denies chest pain, dyspnea, palpitations, headache and blurred vision. Blood pressure normotensive. Mr. Feroz Charlton endorses his BP runs in the low 140-130's over 68 to 70's .   He is taking   verapamil (CALAN SR) 240 MG extended release tablet, TAKE 2 TABLETS BY MOUTH DAILY FOR HIGH BLOOD PRESSURE, Disp: 180 tablet, Rfl: 1      lisinopril-hydroCHLOROthiazide (PRINZIDE;ZESTORETIC) 20-25 MG per tablet, Take 1 tablet by mouth daily, Disp: , Rfl:   depression screening addressed not at risk    abuse screening addressed denies    learning assessment addressed reviewed nurses notes    fall risk addressed not at risk    HM: addressed    ROS:  Gen: denies fever, chills, fatigue, weight loss, weight gain  HEENT:denies blurry vision, nasal congestion, sore throat  Resp: denies dypsnea, cough, wheezing  CV: denies chest pain radiating to the jaws or arms, palpitations, lower extremity edema  Abd: denies nausea, vomiting, diarrhea, constipation  Neuro: denies numbness/tingling  Endo: denies polyuria, polydipsia, heat/cold intolerance  Heme: no lymphadenopathy    No Known Allergies      Current Outpatient Medications:     atorvastatin (LIPITOR) 10 MG tablet, Take 1 tablet by mouth daily, Disp: 90 tablet, Rfl: 1    verapamil (CALAN SR) 240 MG extended release tablet, TAKE 2 TABLETS BY MOUTH DAILY FOR HIGH BLOOD PRESSURE, Disp: 180 tablet, Rfl: 1    fenofibrate (TRIGLIDE) 160 MG tablet, TAKE 1 TABLET BY MOUTH DAILY, Disp: 90 tablet, Rfl: 1    aluminum-magnesium hydroxide 200-200 MG/5ML suspension, Take 15 mLs by mouth every 6 hours as needed, Disp: , Rfl:     dibucaine

## 2023-12-04 RX ORDER — LISINOPRIL AND HYDROCHLOROTHIAZIDE 25; 20 MG/1; MG/1
1 TABLET ORAL DAILY
Qty: 90 TABLET | Refills: 3 | Status: SHIPPED | OUTPATIENT
Start: 2023-12-04

## 2024-01-10 ENCOUNTER — OFFICE VISIT (OUTPATIENT)
Age: 69
End: 2024-01-10
Payer: MEDICARE

## 2024-01-10 VITALS
DIASTOLIC BLOOD PRESSURE: 80 MMHG | WEIGHT: 173 LBS | HEIGHT: 64 IN | HEART RATE: 77 BPM | OXYGEN SATURATION: 98 % | TEMPERATURE: 97.7 F | BODY MASS INDEX: 29.53 KG/M2 | SYSTOLIC BLOOD PRESSURE: 132 MMHG

## 2024-01-10 DIAGNOSIS — R73.03 PREDIABETES: ICD-10-CM

## 2024-01-10 DIAGNOSIS — N18.31 STAGE 3A CHRONIC KIDNEY DISEASE (HCC): ICD-10-CM

## 2024-01-10 DIAGNOSIS — G47.09 OTHER INSOMNIA: ICD-10-CM

## 2024-01-10 DIAGNOSIS — I10 ESSENTIAL HYPERTENSION, BENIGN: Primary | ICD-10-CM

## 2024-01-10 DIAGNOSIS — E78.5 HYPERLIPIDEMIA, UNSPECIFIED HYPERLIPIDEMIA TYPE: ICD-10-CM

## 2024-01-10 DIAGNOSIS — Z23 ENCOUNTER FOR IMMUNIZATION: ICD-10-CM

## 2024-01-10 PROBLEM — N18.30 CKD (CHRONIC KIDNEY DISEASE) STAGE 3, GFR 30-59 ML/MIN (HCC): Status: ACTIVE | Noted: 2024-01-10

## 2024-01-10 LAB
ALBUMIN, URINE, POC: 10 MG/L
CREATININE, URINE, POC: 100 MG/DL
MICROALB/CREAT RATIO, POC: <30 MG/G

## 2024-01-10 PROCEDURE — 82044 UR ALBUMIN SEMIQUANTITATIVE: CPT | Performed by: NURSE PRACTITIONER

## 2024-01-10 PROCEDURE — 36415 COLL VENOUS BLD VENIPUNCTURE: CPT | Performed by: NURSE PRACTITIONER

## 2024-01-10 PROCEDURE — G8484 FLU IMMUNIZE NO ADMIN: HCPCS | Performed by: NURSE PRACTITIONER

## 2024-01-10 PROCEDURE — G8417 CALC BMI ABV UP PARAM F/U: HCPCS | Performed by: NURSE PRACTITIONER

## 2024-01-10 PROCEDURE — 3017F COLORECTAL CA SCREEN DOC REV: CPT | Performed by: NURSE PRACTITIONER

## 2024-01-10 PROCEDURE — 3079F DIAST BP 80-89 MM HG: CPT | Performed by: NURSE PRACTITIONER

## 2024-01-10 PROCEDURE — G8427 DOCREV CUR MEDS BY ELIG CLIN: HCPCS | Performed by: NURSE PRACTITIONER

## 2024-01-10 PROCEDURE — 1123F ACP DISCUSS/DSCN MKR DOCD: CPT | Performed by: NURSE PRACTITIONER

## 2024-01-10 PROCEDURE — 1036F TOBACCO NON-USER: CPT | Performed by: NURSE PRACTITIONER

## 2024-01-10 PROCEDURE — G0009 ADMIN PNEUMOCOCCAL VACCINE: HCPCS | Performed by: NURSE PRACTITIONER

## 2024-01-10 PROCEDURE — 99214 OFFICE O/P EST MOD 30 MIN: CPT | Performed by: NURSE PRACTITIONER

## 2024-01-10 PROCEDURE — 3075F SYST BP GE 130 - 139MM HG: CPT | Performed by: NURSE PRACTITIONER

## 2024-01-10 PROCEDURE — 90677 PCV20 VACCINE IM: CPT | Performed by: NURSE PRACTITIONER

## 2024-01-10 RX ORDER — UREA 10 %
1 LOTION (ML) TOPICAL NIGHTLY PRN
Qty: 30 TABLET | Refills: 5 | Status: SHIPPED | OUTPATIENT
Start: 2024-01-10

## 2024-01-10 ASSESSMENT — PATIENT HEALTH QUESTIONNAIRE - PHQ9
SUM OF ALL RESPONSES TO PHQ QUESTIONS 1-9: 0
2. FEELING DOWN, DEPRESSED OR HOPELESS: 0
1. LITTLE INTEREST OR PLEASURE IN DOING THINGS: 0
SUM OF ALL RESPONSES TO PHQ QUESTIONS 1-9: 0
SUM OF ALL RESPONSES TO PHQ QUESTIONS 1-9: 0
SUM OF ALL RESPONSES TO PHQ9 QUESTIONS 1 & 2: 0
SUM OF ALL RESPONSES TO PHQ QUESTIONS 1-9: 0

## 2024-01-10 NOTE — PROGRESS NOTES
Patient was administered prevnar 20 cc via Im injectiowithout noted side effects.  Patient tolerated medication .  Medication information reviewed with patient, patient states understanding. Patient to resume routine medications at home.  Patient given copy of AVS with medication information and instructions for home.  AVS reviewed with patient and patient states understanding.

## 2024-01-10 NOTE — PROGRESS NOTES
Subjective:     Scott Robert is a 68 y.o. male who presents today with the following:  Chief Complaint   Patient presents with    Hypertension       Patient Active Problem List   Diagnosis    Essential hypertension, benign    Hyperlipemia    CKD (chronic kidney disease) stage 3, GFR 30-59 ml/min ([facility])         COMPLIANT WITH MEDICATION:   HTN; Denies chest pain, dyspnea, palpitations, headache and blurred vision. Blood pressure normotensive. Taking   verapamil (CALAN SR) 240 MG extended release tablet, TAKE 2 TABLETS BY MOUTH DAILY FOR HIGH BLOOD PRESSURE, Disp: 180 tablet, Rfl: 1    lisinopril-hydroCHLOROthiazide (PRINZIDE;ZESTORETIC) 20-25 MG per tablet, TAKE 1 TABLET BY MOUTH EVERY DAY, Disp: 90 tablet, Rfl: 3    Hyperlipidemia; Denies chest pain, dyspnea, palpitations, headache and blurred vision. Blood pressure normotensive. Taking   verapamil (CALAN SR) 240 MG extended release tablet, TAKE 2 TABLETS BY MOUTH DAILY FOR HIGH BLOOD PRESSURE, Disp: 180 tablet, Rfl: 1    lisinopril-hydroCHLOROthiazide (PRINZIDE;ZESTORETIC) 20-25 MG per tablet, TAKE 1 TABLET BY MOUTH EVERY DAY, Disp: 90 tablet, Rfl: 3    atorvastatin (LIPITOR) 10 MG tablet, Take 1 tablet by mouth daily, Disp: 90 tablet, Rfl: 1    fenofibrate (TRIGLIDE) 160 MG tablet, TAKE 1 TABLET BY MOUTH DAILY, Disp: 90 tablet, Rfl: 1    Insomnia:  Mr. Robert endorses he has no difficulty falling asleep but wakes up at 4 am having difficulty going back to sleep.  In the past he has taken melatonin and would like to restart the medication again.  He threw the bottle away so he is unsure regarding the dosage.    depression screening addressed not at risk    abuse screening addressed denies    learning assessment addressed reviewed nurses notes    fall risk addressed not at risk    HM: addressed    ROS:  Gen: denies fever, chills, fatigue, weight loss, weight gain  HEENT:denies blurry vision, nasal congestion, sore throat  Resp: denies dypsnea, cough,

## 2024-01-10 NOTE — PROGRESS NOTES
\"Have you been to the ER, urgent care clinic since your last visit?  Hospitalized since your last visit?\"    NO    “Have you seen or consulted any other health care providers outside of [facility] since your last visit?”    NO

## 2024-01-10 NOTE — PROGRESS NOTES
Chief Complaint   Patient presents with    Hypertension       Vitals:    01/10/24 0831   BP: 132/80   Pulse: 77   Temp: 97.7 °F (36.5 °C)   SpO2: 98%

## 2024-01-11 LAB
ALBUMIN SERPL-MCNC: 4.4 G/DL (ref 3.5–5)
ALBUMIN/GLOB SERPL: 1.3 (ref 1.1–2.2)
ALP SERPL-CCNC: 38 U/L (ref 45–117)
ALT SERPL-CCNC: 25 U/L (ref 12–78)
ANION GAP SERPL CALC-SCNC: 7 MMOL/L (ref 5–15)
AST SERPL-CCNC: 14 U/L (ref 15–37)
BASOPHILS # BLD: 0 K/UL (ref 0–0.1)
BASOPHILS NFR BLD: 1 % (ref 0–1)
BILIRUB SERPL-MCNC: 0.4 MG/DL (ref 0.2–1)
BUN SERPL-MCNC: 23 MG/DL (ref 6–20)
BUN/CREAT SERPL: 15 (ref 12–20)
CALCIUM SERPL-MCNC: 10.3 MG/DL (ref 8.5–10.1)
CHLORIDE SERPL-SCNC: 108 MMOL/L (ref 97–108)
CHOLEST SERPL-MCNC: 138 MG/DL
CO2 SERPL-SCNC: 26 MMOL/L (ref 21–32)
CREAT SERPL-MCNC: 1.55 MG/DL (ref 0.7–1.3)
DIFFERENTIAL METHOD BLD: ABNORMAL
EOSINOPHIL # BLD: 0.1 K/UL (ref 0–0.4)
EOSINOPHIL NFR BLD: 2 % (ref 0–7)
ERYTHROCYTE [DISTWIDTH] IN BLOOD BY AUTOMATED COUNT: 14 % (ref 11.5–14.5)
EST. AVERAGE GLUCOSE BLD GHB EST-MCNC: 120 MG/DL
GLOBULIN SER CALC-MCNC: 3.3 G/DL (ref 2–4)
GLUCOSE SERPL-MCNC: 123 MG/DL (ref 65–100)
HBA1C MFR BLD: 5.8 % (ref 4–5.6)
HCT VFR BLD AUTO: 37.6 % (ref 36.6–50.3)
HDLC SERPL-MCNC: 48 MG/DL
HDLC SERPL: 2.9 (ref 0–5)
HGB BLD-MCNC: 12.3 G/DL (ref 12.1–17)
IMM GRANULOCYTES # BLD AUTO: 0 K/UL (ref 0–0.04)
IMM GRANULOCYTES NFR BLD AUTO: 0 % (ref 0–0.5)
LDLC SERPL CALC-MCNC: 75.8 MG/DL (ref 0–100)
LYMPHOCYTES # BLD: 1.8 K/UL (ref 0.8–3.5)
LYMPHOCYTES NFR BLD: 46 % (ref 12–49)
MCH RBC QN AUTO: 29.9 PG (ref 26–34)
MCHC RBC AUTO-ENTMCNC: 32.7 G/DL (ref 30–36.5)
MCV RBC AUTO: 91.5 FL (ref 80–99)
MONOCYTES # BLD: 0.4 K/UL (ref 0–1)
MONOCYTES NFR BLD: 11 % (ref 5–13)
NEUTS SEG # BLD: 1.6 K/UL (ref 1.8–8)
NEUTS SEG NFR BLD: 40 % (ref 32–75)
NRBC # BLD: 0 K/UL (ref 0–0.01)
NRBC BLD-RTO: 0 PER 100 WBC
PLATELET # BLD AUTO: 340 K/UL (ref 150–400)
PMV BLD AUTO: 10.1 FL (ref 8.9–12.9)
POTASSIUM SERPL-SCNC: 3.9 MMOL/L (ref 3.5–5.1)
PROT SERPL-MCNC: 7.7 G/DL (ref 6.4–8.2)
RBC # BLD AUTO: 4.11 M/UL (ref 4.1–5.7)
RBC MORPH BLD: ABNORMAL
SODIUM SERPL-SCNC: 141 MMOL/L (ref 136–145)
TRIGL SERPL-MCNC: 71 MG/DL
VLDLC SERPL CALC-MCNC: 14.2 MG/DL
WBC # BLD AUTO: 3.9 K/UL (ref 4.1–11.1)

## 2024-01-22 ENCOUNTER — NURSE ONLY (OUTPATIENT)
Age: 69
End: 2024-01-22

## 2024-01-22 DIAGNOSIS — Z12.11 COLON CANCER SCREENING: Primary | ICD-10-CM

## 2024-01-25 LAB — HEMOCCULT STL QL IA: NEGATIVE

## 2024-02-28 ENCOUNTER — TELEPHONE (OUTPATIENT)
Age: 69
End: 2024-02-28

## 2024-02-28 RX ORDER — LISINOPRIL AND HYDROCHLOROTHIAZIDE 25; 20 MG/1; MG/1
1 TABLET ORAL DAILY
Qty: 90 TABLET | Refills: 3 | Status: SHIPPED | OUTPATIENT
Start: 2024-02-28

## 2024-02-28 RX ORDER — FENOFIBRATE 160 MG/1
160 TABLET ORAL DAILY
Qty: 90 TABLET | Refills: 1 | Status: SHIPPED | OUTPATIENT
Start: 2024-02-28

## 2024-02-28 NOTE — TELEPHONE ENCOUNTER
Patient requesting refill on     Requested Prescriptions     Pending Prescriptions Disp Refills    fenofibrate (TRIGLIDE) 160 MG tablet 90 tablet 1     Sig: Take 1 tablet by mouth daily    lisinopril-hydroCHLOROthiazide (PRINZIDE;ZESTORETIC) 20-25 MG per tablet 90 tablet 3     Sig: Take 1 tablet by mouth daily        Last OV 1/10/2024

## 2024-02-28 NOTE — TELEPHONE ENCOUNTER
Medication Refill Request    Scott Robert is requesting a refill of the following medication(s):   fenofibrate (TRIGLIDE) 160 MG tablet    lisinopril-hydroCHLOROthiazide (PRINZIDE;ZESTORETIC) 20-25 MG per tablet  Please send refill to:     [id number] - [place], [place] - [address] [phone] - F [phone]  [address] [facility] [address]  Phone: [phone] Fax: [phone]

## 2024-04-10 ENCOUNTER — TELEPHONE (OUTPATIENT)
Age: 69
End: 2024-04-10

## 2024-04-10 RX ORDER — ATORVASTATIN CALCIUM 10 MG/1
10 TABLET, FILM COATED ORAL DAILY
Qty: 90 TABLET | Refills: 1 | Status: SHIPPED | OUTPATIENT
Start: 2024-04-10

## 2024-04-10 NOTE — TELEPHONE ENCOUNTER
Patient requesting refill on     Requested Prescriptions     Pending Prescriptions Disp Refills    atorvastatin (LIPITOR) 10 MG tablet 90 tablet 1     Sig: Take 1 tablet by mouth daily        Last OV 1/10/2024

## 2024-04-10 NOTE — TELEPHONE ENCOUNTER
Medication Refill Request    Scott Robert is requesting a refill of the following medication(s):     atorvastatin (LIPITOR) 10 MG tablet     Please send refill to:     [facility] #[id number] - [place], [place] - [address] [facility] [phone] - F [phone]  [address] [facility] [address]  Phone: [phone] Fax: [phone]

## 2024-06-18 RX ORDER — VERAPAMIL HYDROCHLORIDE 240 MG/1
TABLET, FILM COATED, EXTENDED RELEASE ORAL
Qty: 180 TABLET | Refills: 1 | Status: SHIPPED | OUTPATIENT
Start: 2024-06-18

## 2024-06-18 NOTE — TELEPHONE ENCOUNTER
Medication Refill Request    Scott Robert is requesting a refill of the following medication(s):   verapamil (CALAN SR) 240 MG extended release tablet   Please send refill to:     [id number] - [place], [place] - [address] [phone] - F [phone]  [address] [facility] [address]  Phone: [phone] Fax: [phone]

## 2024-07-10 ENCOUNTER — OFFICE VISIT (OUTPATIENT)
Age: 69
End: 2024-07-10
Payer: MEDICARE

## 2024-07-10 VITALS
RESPIRATION RATE: 29 BRPM | TEMPERATURE: 97.4 F | BODY MASS INDEX: 29.88 KG/M2 | HEART RATE: 72 BPM | SYSTOLIC BLOOD PRESSURE: 138 MMHG | DIASTOLIC BLOOD PRESSURE: 74 MMHG | WEIGHT: 175 LBS | OXYGEN SATURATION: 98 % | HEIGHT: 64 IN

## 2024-07-10 DIAGNOSIS — E78.5 HYPERLIPIDEMIA, UNSPECIFIED HYPERLIPIDEMIA TYPE: ICD-10-CM

## 2024-07-10 DIAGNOSIS — I10 ESSENTIAL HYPERTENSION, BENIGN: ICD-10-CM

## 2024-07-10 DIAGNOSIS — Z00.00 ENCOUNTER FOR MEDICARE ANNUAL WELLNESS EXAM: Primary | ICD-10-CM

## 2024-07-10 DIAGNOSIS — N18.31 STAGE 3A CHRONIC KIDNEY DISEASE (HCC): ICD-10-CM

## 2024-07-10 PROCEDURE — 1123F ACP DISCUSS/DSCN MKR DOCD: CPT | Performed by: NURSE PRACTITIONER

## 2024-07-10 PROCEDURE — G0439 PPPS, SUBSEQ VISIT: HCPCS | Performed by: NURSE PRACTITIONER

## 2024-07-10 PROCEDURE — 3078F DIAST BP <80 MM HG: CPT | Performed by: NURSE PRACTITIONER

## 2024-07-10 PROCEDURE — 3017F COLORECTAL CA SCREEN DOC REV: CPT | Performed by: NURSE PRACTITIONER

## 2024-07-10 PROCEDURE — 3075F SYST BP GE 130 - 139MM HG: CPT | Performed by: NURSE PRACTITIONER

## 2024-07-10 SDOH — ECONOMIC STABILITY: FOOD INSECURITY: WITHIN THE PAST 12 MONTHS, YOU WORRIED THAT YOUR FOOD WOULD RUN OUT BEFORE YOU GOT MONEY TO BUY MORE.: NEVER TRUE

## 2024-07-10 SDOH — ECONOMIC STABILITY: FOOD INSECURITY: WITHIN THE PAST 12 MONTHS, THE FOOD YOU BOUGHT JUST DIDN'T LAST AND YOU DIDN'T HAVE MONEY TO GET MORE.: NEVER TRUE

## 2024-07-10 ASSESSMENT — LIFESTYLE VARIABLES
HOW OFTEN DO YOU HAVE A DRINK CONTAINING ALCOHOL: MONTHLY OR LESS
HOW MANY STANDARD DRINKS CONTAINING ALCOHOL DO YOU HAVE ON A TYPICAL DAY: 1 OR 2

## 2024-07-10 ASSESSMENT — PATIENT HEALTH QUESTIONNAIRE - PHQ9
2. FEELING DOWN, DEPRESSED OR HOPELESS: NOT AT ALL
SUM OF ALL RESPONSES TO PHQ QUESTIONS 1-9: 0
SUM OF ALL RESPONSES TO PHQ QUESTIONS 1-9: 0
1. LITTLE INTEREST OR PLEASURE IN DOING THINGS: NOT AT ALL
SUM OF ALL RESPONSES TO PHQ QUESTIONS 1-9: 0
SUM OF ALL RESPONSES TO PHQ QUESTIONS 1-9: 0
SUM OF ALL RESPONSES TO PHQ9 QUESTIONS 1 & 2: 0

## 2024-07-13 NOTE — PROGRESS NOTES
\"Have you been to the ER, urgent care clinic since your last visit?  Hospitalized since your last visit?\"    NO    “Have you seen or consulted any other health care providers outside of [facility] since your last visit?”    NO            Click Here for Release of Records Request      Chief Complaint   Patient presents with    Hypertension         Vitals:    07/10/24 0825   BP: (!) 156/82   Pulse: 72   Resp: 29   Temp: 97.4 °F (36.3 °C)   SpO2: 98%     
Yes Bere Mukherjee APRN - NP   lisinopril-hydroCHLOROthiazide (PRINZIDE;ZESTORETIC) 20-25 MG per tablet Take 1 tablet by mouth daily Yes Bere Mukherjee APRN - NP   melatonin 1 MG tablet Take 1 tablet by mouth nightly as needed for Sleep Yes Bere Mukherjee APRN - NP   aluminum-magnesium hydroxide 200-200 MG/5ML suspension Take 15 mLs by mouth every 6 hours as needed Yes Automatic Reconciliation, Ar   dibucaine (NUPERCAINAL) 1 % ointment by Transmucosal route every 4 hours as needed Yes Automatic Reconciliation, Ar   ferrous sulfate (SLOW FE) 142 (45 Fe) MG extended release tablet Take 142 mg by mouth every morning (before breakfast) Yes Automatic Reconciliation, Ar   Hydrocort-Pramoxine, Perianal, (PROCTOFOAM HC) 1-1 % rectal foam Place 1 applicator rectally 2 times daily Yes Automatic Reconciliation, Ar       CareTeam (Including outside providers/suppliers regularly involved in providing care):   Patient Care Team:  Bere Mukherjee APRN - NP as PCP - General  Bere Mukherjee APRN - NP as PCP - Empaneled Provider      Reviewed and updated this visit:  Tobacco  Allergies  Meds  Problems  Med Hx  Surg Hx  Soc Hx  Fam Hx            Bere COHENC

## 2024-08-28 NOTE — TELEPHONE ENCOUNTER
Patient requesting refill on     Requested Prescriptions     Pending Prescriptions Disp Refills    fenofibrate (TRIGLIDE) 160 MG tablet 90 tablet 1     Sig: Take 1 tablet by mouth daily        Last OV 7/10/2024

## 2024-08-28 NOTE — TELEPHONE ENCOUNTER
Medication Refill Request    Scott Robert is requesting a refill of the following medication(s):   fenofibrate (TRIGLIDE) 160 MG tablet     Please send refill to:     [facility] #[id number] - [place], [place] - [address] [phone] - F [phone]  [address] [address]  Phone: [phone] Fax: [phone]

## 2024-08-29 RX ORDER — FENOFIBRATE 160 MG/1
160 TABLET ORAL DAILY
Qty: 90 TABLET | Refills: 1 | Status: SHIPPED | OUTPATIENT
Start: 2024-08-29

## 2024-09-20 RX ORDER — ATORVASTATIN CALCIUM 10 MG/1
10 TABLET, FILM COATED ORAL DAILY
Qty: 90 TABLET | Refills: 1 | Status: SHIPPED | OUTPATIENT
Start: 2024-09-20

## 2024-12-09 ENCOUNTER — OFFICE VISIT (OUTPATIENT)
Age: 69
End: 2024-12-09
Payer: MEDICARE

## 2024-12-09 VITALS
OXYGEN SATURATION: 98 % | TEMPERATURE: 97.7 F | DIASTOLIC BLOOD PRESSURE: 88 MMHG | BODY MASS INDEX: 29.53 KG/M2 | HEART RATE: 90 BPM | WEIGHT: 173 LBS | HEIGHT: 64 IN | SYSTOLIC BLOOD PRESSURE: 144 MMHG

## 2024-12-09 DIAGNOSIS — N18.31 STAGE 3A CHRONIC KIDNEY DISEASE (HCC): ICD-10-CM

## 2024-12-09 DIAGNOSIS — I10 ESSENTIAL HYPERTENSION, BENIGN: Primary | ICD-10-CM

## 2024-12-09 PROCEDURE — 1123F ACP DISCUSS/DSCN MKR DOCD: CPT | Performed by: NURSE PRACTITIONER

## 2024-12-09 PROCEDURE — 3017F COLORECTAL CA SCREEN DOC REV: CPT | Performed by: NURSE PRACTITIONER

## 2024-12-09 PROCEDURE — 3077F SYST BP >= 140 MM HG: CPT | Performed by: NURSE PRACTITIONER

## 2024-12-09 PROCEDURE — 1126F AMNT PAIN NOTED NONE PRSNT: CPT | Performed by: NURSE PRACTITIONER

## 2024-12-09 PROCEDURE — 1159F MED LIST DOCD IN RCRD: CPT | Performed by: NURSE PRACTITIONER

## 2024-12-09 PROCEDURE — G8417 CALC BMI ABV UP PARAM F/U: HCPCS | Performed by: NURSE PRACTITIONER

## 2024-12-09 PROCEDURE — G8427 DOCREV CUR MEDS BY ELIG CLIN: HCPCS | Performed by: NURSE PRACTITIONER

## 2024-12-09 PROCEDURE — 99214 OFFICE O/P EST MOD 30 MIN: CPT | Performed by: NURSE PRACTITIONER

## 2024-12-09 PROCEDURE — 1036F TOBACCO NON-USER: CPT | Performed by: NURSE PRACTITIONER

## 2024-12-09 PROCEDURE — G8484 FLU IMMUNIZE NO ADMIN: HCPCS | Performed by: NURSE PRACTITIONER

## 2024-12-09 PROCEDURE — 3079F DIAST BP 80-89 MM HG: CPT | Performed by: NURSE PRACTITIONER

## 2024-12-09 SDOH — ECONOMIC STABILITY: INCOME INSECURITY: HOW HARD IS IT FOR YOU TO PAY FOR THE VERY BASICS LIKE FOOD, HOUSING, MEDICAL CARE, AND HEATING?: NOT HARD AT ALL

## 2024-12-09 ASSESSMENT — PATIENT HEALTH QUESTIONNAIRE - PHQ9
SUM OF ALL RESPONSES TO PHQ QUESTIONS 1-9: 0
SUM OF ALL RESPONSES TO PHQ QUESTIONS 1-9: 0
2. FEELING DOWN, DEPRESSED OR HOPELESS: NOT AT ALL
1. LITTLE INTEREST OR PLEASURE IN DOING THINGS: NOT AT ALL
SUM OF ALL RESPONSES TO PHQ QUESTIONS 1-9: 0
SUM OF ALL RESPONSES TO PHQ9 QUESTIONS 1 & 2: 0
SUM OF ALL RESPONSES TO PHQ QUESTIONS 1-9: 0

## 2024-12-09 NOTE — PROGRESS NOTES
Subjective:     Scott Robert is a 69 y.o. male who presents today with the following:  Chief Complaint   Patient presents with    Hypertension       Patient Active Problem List   Diagnosis    Essential hypertension, benign    Hyperlipemia    CKD (chronic kidney disease) stage 3, GFR 30-59 ml/min (formerly [facility])         COMPLIANT WITH MEDICATION:   HTN; Denies chest pain, dyspnea, palpitations, headache and blurred vision. Blood pressure elevated today.    Stage 3 chronic kidney disease followed by nephrology.     depression screening addressed not at risk    abuse screening addressed denies    learning assessment addressed reviewed nurses notes    fall risk addressed not at risk    HM: addressed He declines immunization    ROS:  Gen: denies fever, chills, fatigue, weight loss, weight gain  HEENT:denies blurry vision, nasal congestion, sore throat  Resp: denies dypsnea, cough, wheezing  CV: denies chest pain radiating to the jaws or arms, palpitations, lower extremity edema  Abd: denies nausea, vomiting, diarrhea, constipation  Neuro: denies numbness/tingling  Endo: denies polyuria, polydipsia, heat/cold intolerance  Heme: no lymphadenopathy    No Known Allergies      Current Outpatient Medications:     atorvastatin (LIPITOR) 10 MG tablet, TAKE 1 TABLET BY MOUTH DAILY, Disp: 90 tablet, Rfl: 1    fenofibrate (TRIGLIDE) 160 MG tablet, Take 1 tablet by mouth daily, Disp: 90 tablet, Rfl: 1    verapamil (CALAN SR) 240 MG extended release tablet, TAKE 2 TABLETS BY MOUTH DAILY FOR HIGH BLOOD PRESSURE, Disp: 180 tablet, Rfl: 1    lisinopril-hydroCHLOROthiazide (PRINZIDE;ZESTORETIC) 20-25 MG per tablet, Take 1 tablet by mouth daily, Disp: 90 tablet, Rfl: 3    melatonin 1 MG tablet, Take 1 tablet by mouth nightly as needed for Sleep, Disp: 30 tablet, Rfl: 5    aluminum-magnesium hydroxide 200-200 MG/5ML suspension, Take 15 mLs by mouth every 6 hours as needed, Disp: , Rfl:     dibucaine (NUPERCAINAL) 1 % ointment, by Transmucosal

## 2024-12-09 NOTE — PROGRESS NOTES
\"Have you been to the ER, urgent care clinic since your last visit?  Hospitalized since your last visit?\"    NO    “Have you seen or consulted any other health care providers outside of [facility] since your last visit?”    Yes nephrologist  DR Dominguez            Click Here for Release of Records Request      Chief Complaint   Patient presents with    Hypertension         Vitals:    12/09/24 0929   BP: (!) 144/88   Pulse: 90   Temp: 97.7 °F (36.5 °C)   SpO2: 98%

## 2024-12-18 RX ORDER — LISINOPRIL AND HYDROCHLOROTHIAZIDE 20; 25 MG/1; MG/1
1 TABLET ORAL DAILY
Qty: 90 TABLET | Refills: 3 | Status: SHIPPED | OUTPATIENT
Start: 2024-12-18

## 2024-12-18 NOTE — TELEPHONE ENCOUNTER
Medication Refill Request    Scott Robert is requesting a refill of the following medication(s):   verapamil (CALAN SR) 240 MG extended release tablet  Please send refill to:     [facility] #[id number] - [place], [place] - [address] [phone] - F [phone]  [address] [address]  Phone: [phone] Fax: [phone]

## 2024-12-18 NOTE — TELEPHONE ENCOUNTER
Patient requesting refill on     Requested Prescriptions     Pending Prescriptions Disp Refills    lisinopril-hydroCHLOROthiazide (PRINZIDE;ZESTORETIC) 20-25 MG per tablet [Pharmacy Med Name: LISINOPRIL-HCTZ 20/25MG TABLETS] 90 tablet 3     Sig: TAKE 1 TABLET BY MOUTH DAILY        Last OV 12/9/2024

## 2024-12-20 RX ORDER — VERAPAMIL HYDROCHLORIDE 240 MG/1
TABLET, FILM COATED, EXTENDED RELEASE ORAL
Qty: 180 TABLET | Refills: 1 | Status: SHIPPED | OUTPATIENT
Start: 2024-12-20

## 2024-12-20 NOTE — TELEPHONE ENCOUNTER
Patient requesting refill on     Requested Prescriptions     Pending Prescriptions Disp Refills    verapamil (CALAN SR) 240 MG extended release tablet 180 tablet 1     Sig: TAKE 2 TABLETS BY MOUTH DAILY FOR HIGH BLOOD PRESSURE        Last OV 12/9/2024

## 2024-12-20 NOTE — TELEPHONE ENCOUNTER
Medication Refill Request    Scott Robert is requesting a refill of the following medication(s):   verapamil (CALAN SR) 240 MG extended release tablet   Please send refill to:     [facility] #[id number] - [place], [place] - [address] [phone] - F [phone]  [address] [address] [facility] [address]  Phone: [phone] Fax: [phone]

## 2024-12-30 ENCOUNTER — TELEPHONE (OUTPATIENT)
Age: 69
End: 2024-12-30

## 2024-12-30 NOTE — TELEPHONE ENCOUNTER
Pt called stated that he supposed to come in for weekly BP checks as nurse visit. Wanted to double check. Please advise.

## 2024-12-31 ENCOUNTER — NURSE ONLY (OUTPATIENT)
Age: 69
End: 2024-12-31

## 2024-12-31 VITALS
SYSTOLIC BLOOD PRESSURE: 128 MMHG | HEART RATE: 97 BPM | DIASTOLIC BLOOD PRESSURE: 80 MMHG | OXYGEN SATURATION: 99 % | TEMPERATURE: 97 F

## 2024-12-31 DIAGNOSIS — I10 HYPERTENSION, UNSPECIFIED TYPE: Primary | ICD-10-CM

## 2024-12-31 RX ORDER — LISINOPRIL AND HYDROCHLOROTHIAZIDE 12.5; 2 MG/1; MG/1
TABLET ORAL
COMMUNITY
Start: 2024-12-18

## 2025-01-13 ENCOUNTER — OFFICE VISIT (OUTPATIENT)
Age: 70
End: 2025-01-13
Payer: MEDICARE

## 2025-01-13 VITALS
OXYGEN SATURATION: 98 % | HEART RATE: 76 BPM | RESPIRATION RATE: 18 BRPM | HEIGHT: 64 IN | BODY MASS INDEX: 29.37 KG/M2 | SYSTOLIC BLOOD PRESSURE: 142 MMHG | WEIGHT: 172 LBS | DIASTOLIC BLOOD PRESSURE: 60 MMHG | TEMPERATURE: 96.8 F

## 2025-01-13 DIAGNOSIS — N18.31 STAGE 3A CHRONIC KIDNEY DISEASE (HCC): ICD-10-CM

## 2025-01-13 DIAGNOSIS — I10 ESSENTIAL HYPERTENSION, BENIGN: Primary | ICD-10-CM

## 2025-01-13 PROCEDURE — 3077F SYST BP >= 140 MM HG: CPT | Performed by: NURSE PRACTITIONER

## 2025-01-13 PROCEDURE — 1036F TOBACCO NON-USER: CPT | Performed by: NURSE PRACTITIONER

## 2025-01-13 PROCEDURE — 3078F DIAST BP <80 MM HG: CPT | Performed by: NURSE PRACTITIONER

## 2025-01-13 PROCEDURE — 1159F MED LIST DOCD IN RCRD: CPT | Performed by: NURSE PRACTITIONER

## 2025-01-13 PROCEDURE — 1126F AMNT PAIN NOTED NONE PRSNT: CPT | Performed by: NURSE PRACTITIONER

## 2025-01-13 PROCEDURE — 3017F COLORECTAL CA SCREEN DOC REV: CPT | Performed by: NURSE PRACTITIONER

## 2025-01-13 PROCEDURE — G8427 DOCREV CUR MEDS BY ELIG CLIN: HCPCS | Performed by: NURSE PRACTITIONER

## 2025-01-13 PROCEDURE — 99213 OFFICE O/P EST LOW 20 MIN: CPT | Performed by: NURSE PRACTITIONER

## 2025-01-13 PROCEDURE — 1123F ACP DISCUSS/DSCN MKR DOCD: CPT | Performed by: NURSE PRACTITIONER

## 2025-01-13 PROCEDURE — G8417 CALC BMI ABV UP PARAM F/U: HCPCS | Performed by: NURSE PRACTITIONER

## 2025-01-13 SDOH — HEALTH STABILITY: PHYSICAL HEALTH: ON AVERAGE, HOW MANY MINUTES DO YOU ENGAGE IN EXERCISE AT THIS LEVEL?: 60 MIN

## 2025-01-13 SDOH — ECONOMIC STABILITY: INCOME INSECURITY: IN THE LAST 12 MONTHS, WAS THERE A TIME WHEN YOU WERE NOT ABLE TO PAY THE MORTGAGE OR RENT ON TIME?: NO

## 2025-01-13 SDOH — ECONOMIC STABILITY: FOOD INSECURITY: WITHIN THE PAST 12 MONTHS, YOU WORRIED THAT YOUR FOOD WOULD RUN OUT BEFORE YOU GOT MONEY TO BUY MORE.: NEVER TRUE

## 2025-01-13 SDOH — ECONOMIC STABILITY: FOOD INSECURITY: WITHIN THE PAST 12 MONTHS, THE FOOD YOU BOUGHT JUST DIDN'T LAST AND YOU DIDN'T HAVE MONEY TO GET MORE.: NEVER TRUE

## 2025-01-13 SDOH — HEALTH STABILITY: PHYSICAL HEALTH: ON AVERAGE, HOW MANY DAYS PER WEEK DO YOU ENGAGE IN MODERATE TO STRENUOUS EXERCISE (LIKE A BRISK WALK)?: 7 DAYS

## 2025-01-13 ASSESSMENT — SOCIAL DETERMINANTS OF HEALTH (SDOH)
IN A TYPICAL WEEK, HOW MANY TIMES DO YOU TALK ON THE PHONE WITH FAMILY, FRIENDS, OR NEIGHBORS?: MORE THAN THREE TIMES A WEEK
DO YOU BELONG TO ANY CLUBS OR ORGANIZATIONS SUCH AS CHURCH GROUPS UNIONS, FRATERNAL OR ATHLETIC GROUPS, OR SCHOOL GROUPS?: NO
HOW HARD IS IT FOR YOU TO PAY FOR THE VERY BASICS LIKE FOOD, HOUSING, MEDICAL CARE, AND HEATING?: NOT HARD AT ALL
HOW OFTEN DO YOU ATTEND CHURCH OR RELIGIOUS SERVICES?: NEVER
HOW OFTEN DO YOU ATTENT MEETINGS OF THE CLUB OR ORGANIZATION YOU BELONG TO?: NEVER
HOW OFTEN DO YOU GET TOGETHER WITH FRIENDS OR RELATIVES?: MORE THAN THREE TIMES A WEEK

## 2025-01-13 ASSESSMENT — PATIENT HEALTH QUESTIONNAIRE - PHQ9
SUM OF ALL RESPONSES TO PHQ9 QUESTIONS 1 & 2: 0
SUM OF ALL RESPONSES TO PHQ QUESTIONS 1-9: 0
2. FEELING DOWN, DEPRESSED OR HOPELESS: NOT AT ALL
1. LITTLE INTEREST OR PLEASURE IN DOING THINGS: NOT AT ALL

## 2025-01-13 ASSESSMENT — LIFESTYLE VARIABLES
HOW MANY STANDARD DRINKS CONTAINING ALCOHOL DO YOU HAVE ON A TYPICAL DAY: 1 OR 2
HOW OFTEN DO YOU HAVE A DRINK CONTAINING ALCOHOL: MONTHLY OR LESS

## 2025-01-13 NOTE — PROGRESS NOTES
\"Have you been to the ER, urgent care clinic since your last visit?  Hospitalized since your last visit?\"    NO    “Have you seen or consulted any other health care providers outside of [facility] since your last visit?”    NO            Click Here for Release of Records Request      Chief Complaint   Patient presents with    Hypertension         Vitals:    01/13/25 0853   BP: (!) 142/60   Pulse: 76   Resp: 18   Temp: 96.8 °F (36 °C)   SpO2: 98%

## 2025-02-13 ENCOUNTER — OFFICE VISIT (OUTPATIENT)
Age: 70
End: 2025-02-13
Payer: MEDICARE

## 2025-02-13 VITALS
HEIGHT: 64 IN | RESPIRATION RATE: 18 BRPM | WEIGHT: 171 LBS | SYSTOLIC BLOOD PRESSURE: 136 MMHG | DIASTOLIC BLOOD PRESSURE: 70 MMHG | OXYGEN SATURATION: 98 % | HEART RATE: 78 BPM | BODY MASS INDEX: 29.19 KG/M2 | TEMPERATURE: 97.4 F

## 2025-02-13 DIAGNOSIS — E78.5 HYPERLIPIDEMIA, UNSPECIFIED HYPERLIPIDEMIA TYPE: ICD-10-CM

## 2025-02-13 DIAGNOSIS — I10 ESSENTIAL HYPERTENSION, BENIGN: Primary | ICD-10-CM

## 2025-02-13 DIAGNOSIS — R73.03 PREDIABETES: ICD-10-CM

## 2025-02-13 DIAGNOSIS — N18.31 STAGE 3A CHRONIC KIDNEY DISEASE (HCC): ICD-10-CM

## 2025-02-13 PROCEDURE — G8417 CALC BMI ABV UP PARAM F/U: HCPCS | Performed by: NURSE PRACTITIONER

## 2025-02-13 PROCEDURE — 3075F SYST BP GE 130 - 139MM HG: CPT | Performed by: NURSE PRACTITIONER

## 2025-02-13 PROCEDURE — 1123F ACP DISCUSS/DSCN MKR DOCD: CPT | Performed by: NURSE PRACTITIONER

## 2025-02-13 PROCEDURE — 3017F COLORECTAL CA SCREEN DOC REV: CPT | Performed by: NURSE PRACTITIONER

## 2025-02-13 PROCEDURE — 1160F RVW MEDS BY RX/DR IN RCRD: CPT | Performed by: NURSE PRACTITIONER

## 2025-02-13 PROCEDURE — 3078F DIAST BP <80 MM HG: CPT | Performed by: NURSE PRACTITIONER

## 2025-02-13 PROCEDURE — 1126F AMNT PAIN NOTED NONE PRSNT: CPT | Performed by: NURSE PRACTITIONER

## 2025-02-13 PROCEDURE — 1036F TOBACCO NON-USER: CPT | Performed by: NURSE PRACTITIONER

## 2025-02-13 PROCEDURE — 1159F MED LIST DOCD IN RCRD: CPT | Performed by: NURSE PRACTITIONER

## 2025-02-13 PROCEDURE — 99214 OFFICE O/P EST MOD 30 MIN: CPT | Performed by: NURSE PRACTITIONER

## 2025-02-13 PROCEDURE — 36415 COLL VENOUS BLD VENIPUNCTURE: CPT | Performed by: NURSE PRACTITIONER

## 2025-02-13 PROCEDURE — G8427 DOCREV CUR MEDS BY ELIG CLIN: HCPCS | Performed by: NURSE PRACTITIONER

## 2025-02-13 NOTE — PROGRESS NOTES
Patient here for labs drawn from right  antecubital without pain or bruising.   
\"Have you been to the ER, urgent care clinic since your last visit?  Hospitalized since your last visit?\"    NO    “Have you seen or consulted any other health care providers outside of [facility] since your last visit?”    NO        “Have you had a colorectal cancer screening such as a colonoscopy/FIT/Cologuard?    no    No colonoscopy on file  No cologuard on file  Date of last FIT: 1/22/2024   No flexible sigmoidoscopy on file         Click Here for Release of Records Request      Chief Complaint   Patient presents with    Hypertension         Vitals:    02/13/25 0835   BP: 136/70   Pulse: 78   Resp: 18   Temp: 97.4 °F (36.3 °C)   SpO2: 98%     
lisinopril-hydroCHLOROthiazide (PRINZIDE;ZESTORETIC) 20-12.5 MG per tablet, TAKE 2 TABLETS BY MOUTH EVERY DAY AS DIRECTED FOR HIGH BLOOD PRESSURE, Disp: , Rfl:     verapamil (CALAN SR) 240 MG extended release tablet, TAKE 2 TABLETS BY MOUTH DAILY FOR HIGH BLOOD PRESSURE, Disp: 180 tablet, Rfl: 1    atorvastatin (LIPITOR) 10 MG tablet, TAKE 1 TABLET BY MOUTH DAILY, Disp: 90 tablet, Rfl: 1    fenofibrate (TRIGLIDE) 160 MG tablet, Take 1 tablet by mouth daily, Disp: 90 tablet, Rfl: 1    melatonin 1 MG tablet, Take 1 tablet by mouth nightly as needed for Sleep, Disp: 30 tablet, Rfl: 5    ferrous sulfate (SLOW FE) 142 (45 Fe) MG extended release tablet, Take 142 mg by mouth every morning (before breakfast), Disp: , Rfl:     Past Medical History:   Diagnosis Date    CKD (chronic kidney disease) stage 3, GFR 30-59 ml/min ([facility])     Hypertension     Hypopotassemia 2009    Other and unspecified hyperlipidemia 2008       History reviewed. No pertinent surgical history.    Family History   Problem Relation Age of Onset    Hypertension Mother          Objective:     /70 (Site: Right Upper Arm, Position: Sitting, Cuff Size: Medium Adult)   Pulse 78   Temp 97.4 °F (36.3 °C) (Temporal)   Resp 18   Ht 1.613 m (5' 3.5\")   Wt 77.6 kg (171 lb)   SpO2 98%   BMI 29.82 kg/m²   Body mass index is 29.82 kg/m².  General:  Eyes:  Alert and oriented. No acute distress.   Sclera anicteric. Pupils equal, round, reactive to light.   Mouth/Throat: Clear   Neck: Supple. Trachea Midline. No carotid bruits or significant lymphadenopathy   Lungs:   Clear to auscultation bilaterally without wheezes, rales or rhonchi   Cardiovascular:  Regular rate and rhythm, no murmur, rub, or gallop.   Abdomen:   Soft, non-tender, bowel sounds normoactive, non-distended.   Extremities: No cyanosis or edema.   Musculoskeletal:  No swelling or deformity.   Neurological: Cranial nerves II through XII grossly normal.    Psychiatric: Appropriate behavior,

## 2025-02-14 LAB
ALBUMIN SERPL-MCNC: 4.5 G/DL (ref 3.5–5)
ALBUMIN/GLOB SERPL: 1.6 (ref 1.1–2.2)
ALP SERPL-CCNC: 34 U/L (ref 45–117)
ALT SERPL-CCNC: 23 U/L (ref 12–78)
ANION GAP SERPL CALC-SCNC: 8 MMOL/L (ref 2–12)
AST SERPL-CCNC: 18 U/L (ref 15–37)
BASOPHILS # BLD: 0.05 K/UL (ref 0–0.1)
BASOPHILS NFR BLD: 1.4 % (ref 0–1)
BILIRUB SERPL-MCNC: 0.4 MG/DL (ref 0.2–1)
BUN SERPL-MCNC: 25 MG/DL (ref 6–20)
BUN/CREAT SERPL: 16 (ref 12–20)
CALCIUM SERPL-MCNC: 9.9 MG/DL (ref 8.5–10.1)
CHLORIDE SERPL-SCNC: 107 MMOL/L (ref 97–108)
CHOLEST SERPL-MCNC: 136 MG/DL
CO2 SERPL-SCNC: 25 MMOL/L (ref 21–32)
CREAT SERPL-MCNC: 1.58 MG/DL (ref 0.7–1.3)
DIFFERENTIAL METHOD BLD: ABNORMAL
EOSINOPHIL # BLD: 0.13 K/UL (ref 0–0.4)
EOSINOPHIL NFR BLD: 3.7 % (ref 0–7)
ERYTHROCYTE [DISTWIDTH] IN BLOOD BY AUTOMATED COUNT: 13.3 % (ref 11.5–14.5)
EST. AVERAGE GLUCOSE BLD GHB EST-MCNC: 126 MG/DL
GLOBULIN SER CALC-MCNC: 2.8 G/DL (ref 2–4)
GLUCOSE SERPL-MCNC: 111 MG/DL (ref 65–100)
HBA1C MFR BLD: 6 % (ref 4–5.6)
HCT VFR BLD AUTO: 34.1 % (ref 36.6–50.3)
HDLC SERPL-MCNC: 48 MG/DL
HDLC SERPL: 2.8 (ref 0–5)
HGB BLD-MCNC: 11.4 G/DL (ref 12.1–17)
IMM GRANULOCYTES # BLD AUTO: 0 K/UL (ref 0–0.04)
IMM GRANULOCYTES NFR BLD AUTO: 0 % (ref 0–0.5)
LDLC SERPL CALC-MCNC: 74.6 MG/DL (ref 0–100)
LYMPHOCYTES # BLD: 1.64 K/UL (ref 0.8–3.5)
LYMPHOCYTES NFR BLD: 46.5 % (ref 12–49)
MCH RBC QN AUTO: 30.5 PG (ref 26–34)
MCHC RBC AUTO-ENTMCNC: 33.4 G/DL (ref 30–36.5)
MCV RBC AUTO: 91.2 FL (ref 80–99)
MONOCYTES # BLD: 0.35 K/UL (ref 0–1)
MONOCYTES NFR BLD: 9.9 % (ref 5–13)
NEUTS SEG # BLD: 1.36 K/UL (ref 1.8–8)
NEUTS SEG NFR BLD: 38.5 % (ref 32–75)
NRBC # BLD: 0 K/UL (ref 0–0.01)
NRBC BLD-RTO: 0 PER 100 WBC
PLATELET # BLD AUTO: 355 K/UL (ref 150–400)
PMV BLD AUTO: 11.1 FL (ref 8.9–12.9)
POTASSIUM SERPL-SCNC: 3.6 MMOL/L (ref 3.5–5.1)
PROT SERPL-MCNC: 7.3 G/DL (ref 6.4–8.2)
RBC # BLD AUTO: 3.74 M/UL (ref 4.1–5.7)
SODIUM SERPL-SCNC: 140 MMOL/L (ref 136–145)
TRIGL SERPL-MCNC: 67 MG/DL
VLDLC SERPL CALC-MCNC: 13.4 MG/DL
WBC # BLD AUTO: 3.5 K/UL (ref 4.1–11.1)

## 2025-02-27 LAB — HEMOCCULT STL QL IA: NEGATIVE

## 2025-02-27 NOTE — TELEPHONE ENCOUNTER
Medication Refill Request    Scott Robert is requesting a refill of the following medication(s):     fenofibrate (TRIGLIDE) 160 MG tablet   Please send refill to:     [facility] #[id number] - [place], [place] - [address] [phone] - F [phone]  [address] [address]  Phone: [phone] Fax: [phone]

## 2025-02-28 RX ORDER — FENOFIBRATE 160 MG/1
160 TABLET ORAL DAILY
Qty: 90 TABLET | Refills: 1 | Status: SHIPPED | OUTPATIENT
Start: 2025-02-28

## 2025-04-17 RX ORDER — ATORVASTATIN CALCIUM 10 MG/1
10 TABLET, FILM COATED ORAL DAILY
Qty: 90 TABLET | Refills: 1 | Status: SHIPPED | OUTPATIENT
Start: 2025-04-17

## 2025-06-13 RX ORDER — VERAPAMIL HYDROCHLORIDE 240 MG/1
TABLET, FILM COATED, EXTENDED RELEASE ORAL
Qty: 180 TABLET | Refills: 1 | Status: SHIPPED | OUTPATIENT
Start: 2025-06-13

## 2025-06-16 RX ORDER — FENOFIBRATE 160 MG/1
160 TABLET ORAL DAILY
Qty: 90 TABLET | Refills: 1 | Status: SHIPPED | OUTPATIENT
Start: 2025-06-16

## 2025-08-19 ENCOUNTER — OFFICE VISIT (OUTPATIENT)
Age: 70
End: 2025-08-19
Payer: MEDICARE

## 2025-08-19 VITALS
WEIGHT: 170 LBS | TEMPERATURE: 97.5 F | OXYGEN SATURATION: 97 % | HEART RATE: 70 BPM | SYSTOLIC BLOOD PRESSURE: 122 MMHG | DIASTOLIC BLOOD PRESSURE: 78 MMHG | BODY MASS INDEX: 29.02 KG/M2 | HEIGHT: 64 IN | RESPIRATION RATE: 18 BRPM

## 2025-08-19 DIAGNOSIS — R35.1 NOCTURIA: ICD-10-CM

## 2025-08-19 DIAGNOSIS — E78.5 HYPERLIPIDEMIA, UNSPECIFIED HYPERLIPIDEMIA TYPE: ICD-10-CM

## 2025-08-19 DIAGNOSIS — Z00.00 MEDICARE ANNUAL WELLNESS VISIT, SUBSEQUENT: Primary | ICD-10-CM

## 2025-08-19 DIAGNOSIS — I10 ESSENTIAL HYPERTENSION, BENIGN: ICD-10-CM

## 2025-08-19 DIAGNOSIS — N18.31 STAGE 3A CHRONIC KIDNEY DISEASE (HCC): ICD-10-CM

## 2025-08-19 DIAGNOSIS — Z00.00 ENCOUNTER FOR MEDICARE ANNUAL WELLNESS EXAM: ICD-10-CM

## 2025-08-19 DIAGNOSIS — R73.03 PREDIABETES: ICD-10-CM

## 2025-08-19 PROCEDURE — 1159F MED LIST DOCD IN RCRD: CPT | Performed by: NURSE PRACTITIONER

## 2025-08-19 PROCEDURE — 99214 OFFICE O/P EST MOD 30 MIN: CPT | Performed by: NURSE PRACTITIONER

## 2025-08-19 PROCEDURE — 1160F RVW MEDS BY RX/DR IN RCRD: CPT | Performed by: NURSE PRACTITIONER

## 2025-08-19 PROCEDURE — G0439 PPPS, SUBSEQ VISIT: HCPCS | Performed by: NURSE PRACTITIONER

## 2025-08-19 PROCEDURE — 1123F ACP DISCUSS/DSCN MKR DOCD: CPT | Performed by: NURSE PRACTITIONER

## 2025-08-19 PROCEDURE — G8427 DOCREV CUR MEDS BY ELIG CLIN: HCPCS | Performed by: NURSE PRACTITIONER

## 2025-08-19 PROCEDURE — 3078F DIAST BP <80 MM HG: CPT | Performed by: NURSE PRACTITIONER

## 2025-08-19 PROCEDURE — 3074F SYST BP LT 130 MM HG: CPT | Performed by: NURSE PRACTITIONER

## 2025-08-19 PROCEDURE — 1126F AMNT PAIN NOTED NONE PRSNT: CPT | Performed by: NURSE PRACTITIONER

## 2025-08-19 PROCEDURE — 1036F TOBACCO NON-USER: CPT | Performed by: NURSE PRACTITIONER

## 2025-08-19 PROCEDURE — 36415 COLL VENOUS BLD VENIPUNCTURE: CPT | Performed by: NURSE PRACTITIONER

## 2025-08-19 PROCEDURE — G8417 CALC BMI ABV UP PARAM F/U: HCPCS | Performed by: NURSE PRACTITIONER

## 2025-08-19 PROCEDURE — 3017F COLORECTAL CA SCREEN DOC REV: CPT | Performed by: NURSE PRACTITIONER

## 2025-08-19 ASSESSMENT — PATIENT HEALTH QUESTIONNAIRE - PHQ9
2. FEELING DOWN, DEPRESSED OR HOPELESS: NOT AT ALL
SUM OF ALL RESPONSES TO PHQ QUESTIONS 1-9: 0
1. LITTLE INTEREST OR PLEASURE IN DOING THINGS: NOT AT ALL
SUM OF ALL RESPONSES TO PHQ QUESTIONS 1-9: 0

## 2025-08-19 ASSESSMENT — LIFESTYLE VARIABLES
HOW MANY STANDARD DRINKS CONTAINING ALCOHOL DO YOU HAVE ON A TYPICAL DAY: PATIENT DOES NOT DRINK
HOW MANY STANDARD DRINKS CONTAINING ALCOHOL DO YOU HAVE ON A TYPICAL DAY: PATIENT DOES NOT DRINK
HOW OFTEN DO YOU HAVE A DRINK CONTAINING ALCOHOL: NEVER

## 2025-08-20 LAB
ALBUMIN SERPL-MCNC: 4.6 G/DL (ref 3.5–5.2)
ALBUMIN/GLOB SERPL: 1.9 (ref 1.1–2.2)
ALP SERPL-CCNC: 30 U/L (ref 40–129)
ALT SERPL-CCNC: 15 U/L (ref 10–50)
ANION GAP SERPL CALC-SCNC: 13 MMOL/L (ref 2–14)
AST SERPL-CCNC: 19 U/L (ref 10–50)
BASOPHILS # BLD: 0.06 K/UL (ref 0–0.1)
BASOPHILS NFR BLD: 1.5 % (ref 0–1)
BILIRUB SERPL-MCNC: 0.4 MG/DL (ref 0–1.2)
BUN SERPL-MCNC: 31 MG/DL (ref 8–23)
CALCIUM SERPL-MCNC: 9.9 MG/DL (ref 8.8–10.2)
CHLORIDE SERPL-SCNC: 106 MMOL/L (ref 98–107)
CHOLEST SERPL-MCNC: 131 MG/DL (ref 0–200)
CO2 SERPL-SCNC: 24 MMOL/L (ref 20–29)
CREAT SERPL-MCNC: 1.88 MG/DL (ref 0.7–1.2)
DIFFERENTIAL METHOD BLD: ABNORMAL
EOSINOPHIL # BLD: 0.1 K/UL (ref 0–0.4)
EOSINOPHIL NFR BLD: 2.5 % (ref 0–7)
ERYTHROCYTE [DISTWIDTH] IN BLOOD BY AUTOMATED COUNT: 13.3 % (ref 11.5–14.5)
EST. AVERAGE GLUCOSE BLD GHB EST-MCNC: 122 MG/DL
GLOBULIN SER CALC-MCNC: 2.4 G/DL (ref 2–4)
GLUCOSE SERPL-MCNC: 122 MG/DL (ref 65–100)
HBA1C MFR BLD: 5.9 % (ref 4–5.6)
HCT VFR BLD AUTO: 31.8 % (ref 36.6–50.3)
HDLC SERPL-MCNC: 43 MG/DL (ref 40–60)
HDLC SERPL: 3 (ref 0–5)
HGB BLD-MCNC: 10.7 G/DL (ref 12.1–17)
IMM GRANULOCYTES # BLD AUTO: 0.01 K/UL (ref 0–0.04)
IMM GRANULOCYTES NFR BLD AUTO: 0.3 % (ref 0–0.5)
LDLC SERPL CALC-MCNC: 71 MG/DL
LYMPHOCYTES # BLD: 1.16 K/UL (ref 0.8–3.5)
LYMPHOCYTES NFR BLD: 29.4 % (ref 12–49)
MCH RBC QN AUTO: 31.1 PG (ref 26–34)
MCHC RBC AUTO-ENTMCNC: 33.6 G/DL (ref 30–36.5)
MCV RBC AUTO: 92.4 FL (ref 80–99)
MONOCYTES # BLD: 0.38 K/UL (ref 0–1)
MONOCYTES NFR BLD: 9.6 % (ref 5–13)
NEUTS SEG # BLD: 2.23 K/UL (ref 1.8–8)
NEUTS SEG NFR BLD: 56.7 % (ref 32–75)
NRBC # BLD: 0 K/UL (ref 0–0.01)
NRBC BLD-RTO: 0 PER 100 WBC
PLATELET # BLD AUTO: 305 K/UL (ref 150–400)
PMV BLD AUTO: 10.8 FL (ref 8.9–12.9)
POTASSIUM SERPL-SCNC: 4.1 MMOL/L (ref 3.5–5.1)
PROT SERPL-MCNC: 7 G/DL (ref 6.4–8.3)
PSA SERPL-MCNC: 0.9 NG/ML (ref 0–4.1)
RBC # BLD AUTO: 3.44 M/UL (ref 4.1–5.7)
SODIUM SERPL-SCNC: 142 MMOL/L (ref 136–145)
TRIGL SERPL-MCNC: 84 MG/DL (ref 0–150)
VLDLC SERPL CALC-MCNC: 17 MG/DL
WBC # BLD AUTO: 3.9 K/UL (ref 4.1–11.1)